# Patient Record
Sex: MALE | Race: ASIAN | NOT HISPANIC OR LATINO | URBAN - METROPOLITAN AREA
[De-identification: names, ages, dates, MRNs, and addresses within clinical notes are randomized per-mention and may not be internally consistent; named-entity substitution may affect disease eponyms.]

---

## 2021-08-16 ENCOUNTER — INPATIENT (INPATIENT)
Facility: HOSPITAL | Age: 51
LOS: 7 days | Discharge: PSYCHIATRIC OR SUBSTANCE USE FACILITY | DRG: 885 | End: 2021-08-24
Attending: GENERAL ACUTE CARE HOSPITAL | Admitting: STUDENT IN AN ORGANIZED HEALTH CARE EDUCATION/TRAINING PROGRAM
Payer: MEDICAID

## 2021-08-16 VITALS
RESPIRATION RATE: 18 BRPM | DIASTOLIC BLOOD PRESSURE: 69 MMHG | WEIGHT: 139.99 LBS | HEART RATE: 79 BPM | TEMPERATURE: 98 F | SYSTOLIC BLOOD PRESSURE: 120 MMHG | OXYGEN SATURATION: 96 % | HEIGHT: 63 IN

## 2021-08-16 DIAGNOSIS — F29 UNSPECIFIED PSYCHOSIS NOT DUE TO A SUBSTANCE OR KNOWN PHYSIOLOGICAL CONDITION: ICD-10-CM

## 2021-08-16 LAB
ALBUMIN SERPL ELPH-MCNC: 4.2 G/DL — SIGNIFICANT CHANGE UP (ref 3.3–5)
ALP SERPL-CCNC: 45 U/L — SIGNIFICANT CHANGE UP (ref 40–120)
ALT FLD-CCNC: 20 U/L — SIGNIFICANT CHANGE UP (ref 10–45)
AMPHET UR-MCNC: NEGATIVE — SIGNIFICANT CHANGE UP
ANION GAP SERPL CALC-SCNC: 13 MMOL/L — SIGNIFICANT CHANGE UP (ref 5–17)
APPEARANCE UR: ABNORMAL
AST SERPL-CCNC: 51 U/L — HIGH (ref 10–40)
BACTERIA # UR AUTO: ABNORMAL
BARBITURATES UR SCN-MCNC: NEGATIVE — SIGNIFICANT CHANGE UP
BASOPHILS # BLD AUTO: 0.02 K/UL — SIGNIFICANT CHANGE UP (ref 0–0.2)
BASOPHILS NFR BLD AUTO: 0.4 % — SIGNIFICANT CHANGE UP (ref 0–2)
BENZODIAZ UR-MCNC: NEGATIVE — SIGNIFICANT CHANGE UP
BILIRUB SERPL-MCNC: 2.2 MG/DL — HIGH (ref 0.2–1.2)
BILIRUB UR-MCNC: ABNORMAL
BUN SERPL-MCNC: 30 MG/DL — HIGH (ref 7–23)
CALCIUM SERPL-MCNC: 9 MG/DL — SIGNIFICANT CHANGE UP (ref 8.4–10.5)
CHLORIDE SERPL-SCNC: 108 MMOL/L — SIGNIFICANT CHANGE UP (ref 96–108)
CO2 SERPL-SCNC: 21 MMOL/L — LOW (ref 22–31)
COCAINE METAB.OTHER UR-MCNC: NEGATIVE — SIGNIFICANT CHANGE UP
COLOR SPEC: ABNORMAL
COMMENT - URINE: SIGNIFICANT CHANGE UP
CREAT SERPL-MCNC: 0.84 MG/DL — SIGNIFICANT CHANGE UP (ref 0.5–1.3)
DIFF PNL FLD: NEGATIVE — SIGNIFICANT CHANGE UP
EOSINOPHIL # BLD AUTO: 0.24 K/UL — SIGNIFICANT CHANGE UP (ref 0–0.5)
EOSINOPHIL NFR BLD AUTO: 5.2 % — SIGNIFICANT CHANGE UP (ref 0–6)
EPI CELLS # UR: 2 — SIGNIFICANT CHANGE UP
ETHANOL SERPL-MCNC: SIGNIFICANT CHANGE UP MG/DL (ref 0–10)
GLUCOSE SERPL-MCNC: 126 MG/DL — HIGH (ref 70–99)
GLUCOSE UR QL: NEGATIVE — SIGNIFICANT CHANGE UP
HCT VFR BLD CALC: 37.5 % — LOW (ref 39–50)
HGB BLD-MCNC: 12.1 G/DL — LOW (ref 13–17)
IMM GRANULOCYTES NFR BLD AUTO: 0.2 % — SIGNIFICANT CHANGE UP (ref 0–1.5)
KETONES UR-MCNC: ABNORMAL
LEUKOCYTE ESTERASE UR-ACNC: NEGATIVE — SIGNIFICANT CHANGE UP
LYMPHOCYTES # BLD AUTO: 1.71 K/UL — SIGNIFICANT CHANGE UP (ref 1–3.3)
LYMPHOCYTES # BLD AUTO: 37 % — SIGNIFICANT CHANGE UP (ref 13–44)
MCHC RBC-ENTMCNC: 30.3 PG — SIGNIFICANT CHANGE UP (ref 27–34)
MCHC RBC-ENTMCNC: 32.3 GM/DL — SIGNIFICANT CHANGE UP (ref 32–36)
MCV RBC AUTO: 93.8 FL — SIGNIFICANT CHANGE UP (ref 80–100)
METHADONE UR-MCNC: NEGATIVE — SIGNIFICANT CHANGE UP
MONOCYTES # BLD AUTO: 0.56 K/UL — SIGNIFICANT CHANGE UP (ref 0–0.9)
MONOCYTES NFR BLD AUTO: 12.1 % — SIGNIFICANT CHANGE UP (ref 2–14)
NEUTROPHILS # BLD AUTO: 2.08 K/UL — SIGNIFICANT CHANGE UP (ref 1.8–7.4)
NEUTROPHILS NFR BLD AUTO: 45.1 % — SIGNIFICANT CHANGE UP (ref 43–77)
NITRITE UR-MCNC: NEGATIVE — SIGNIFICANT CHANGE UP
NRBC # BLD: 0 /100 WBCS — SIGNIFICANT CHANGE UP (ref 0–0)
OPIATES UR-MCNC: NEGATIVE — SIGNIFICANT CHANGE UP
OXYCODONE UR-MCNC: NEGATIVE — SIGNIFICANT CHANGE UP
PCP SPEC-MCNC: SIGNIFICANT CHANGE UP
PCP UR-MCNC: NEGATIVE — SIGNIFICANT CHANGE UP
PH UR: 5.5 — SIGNIFICANT CHANGE UP (ref 5–8)
PLATELET # BLD AUTO: 203 K/UL — SIGNIFICANT CHANGE UP (ref 150–400)
POTASSIUM SERPL-MCNC: 3.5 MMOL/L — SIGNIFICANT CHANGE UP (ref 3.5–5.3)
POTASSIUM SERPL-SCNC: 3.5 MMOL/L — SIGNIFICANT CHANGE UP (ref 3.5–5.3)
PROT SERPL-MCNC: 6.1 G/DL — SIGNIFICANT CHANGE UP (ref 6–8.3)
PROT UR-MCNC: ABNORMAL
RBC # BLD: 4 M/UL — LOW (ref 4.2–5.8)
RBC # FLD: 14.3 % — SIGNIFICANT CHANGE UP (ref 10.3–14.5)
RBC CASTS # UR COMP ASSIST: 1 /HPF — SIGNIFICANT CHANGE UP (ref 0–4)
SARS-COV-2 RNA SPEC QL NAA+PROBE: DETECTED
SARS-COV-2 RNA SPEC QL NAA+PROBE: DETECTED
SODIUM SERPL-SCNC: 142 MMOL/L — SIGNIFICANT CHANGE UP (ref 135–145)
SP GR SPEC: 1.04 — HIGH (ref 1.01–1.02)
THC UR QL: NEGATIVE — SIGNIFICANT CHANGE UP
TSH SERPL-MCNC: 3.75 UIU/ML — SIGNIFICANT CHANGE UP (ref 0.27–4.2)
UROBILINOGEN FLD QL: NEGATIVE — SIGNIFICANT CHANGE UP
WBC # BLD: 4.62 K/UL — SIGNIFICANT CHANGE UP (ref 3.8–10.5)
WBC # FLD AUTO: 4.62 K/UL — SIGNIFICANT CHANGE UP (ref 3.8–10.5)
WBC UR QL: 3 /HPF — SIGNIFICANT CHANGE UP (ref 0–5)

## 2021-08-16 PROCEDURE — 99285 EMERGENCY DEPT VISIT HI MDM: CPT

## 2021-08-16 PROCEDURE — 71045 X-RAY EXAM CHEST 1 VIEW: CPT | Mod: 26

## 2021-08-16 PROCEDURE — 93010 ELECTROCARDIOGRAM REPORT: CPT

## 2021-08-16 PROCEDURE — 70498 CT ANGIOGRAPHY NECK: CPT | Mod: 26,MA

## 2021-08-16 PROCEDURE — 99222 1ST HOSP IP/OBS MODERATE 55: CPT

## 2021-08-16 PROCEDURE — 70496 CT ANGIOGRAPHY HEAD: CPT | Mod: 26,MA

## 2021-08-16 NOTE — ED BEHAVIORAL HEALTH ASSESSMENT NOTE - RISK ASSESSMENT
Risk factors:, Auditory hallucinations, unable to care for self 2/2 current psychiatric illness, no history of SI/HI    Protective factors:  help-seeking behaviors, future-oriented Moderate Acute Suicide Risk

## 2021-08-16 NOTE — ED BEHAVIORAL HEALTH ASSESSMENT NOTE - HPI (INCLUDE ILLNESS QUALITY, SEVERITY, DURATION, TIMING, CONTEXT, MODIFYING FACTORS, ASSOCIATED SIGNS AND SYMPTOMS)
50 y/o Mandarin speaking, domiciled, single employed w/p recent hospitalization of COVID infection (?mid July) who was BIBEMS for tom kevin after landlorfelicia noticed he has been making strange noises at odd hours. Of note patient does not have history of psychiatric diagnoses. Per patient he has been "cleaning at night since August 11" due to which he has not slept well. Notes he hears noises like "Wooh and ahhhh" and is convinced "witchcraft is happening." Patient states his head has been clouded since his COVID infection, hospitalization and subsequent d/c. Patient also mentioned to the ED team that he was convinced he was being watched and a camera had been placed underneath his bed to spy on him. Patient reports he has been cleaning endlessly trying to locate an pungent odor and states there is "too much garbage around," and insinuates someone sabotaged him and left this garbage in his apartment while he was hospitalized. Patient states he does not speak to his roommate and does not feel this has been the work of his roommate. He is aware of the noise complaints made about him with the landlord but states he must clean, or he "falls everywhere,' as he shows multiple small punctures and abrasions on his legs bilaterally. Patient haded provider 4 pages of written notes stating it is a timeline, the content of the note is disorganized and english is interspersed with English. Patient continues to jot down names of physicians and discharge with text such as " I just sitting in front of TV," while adding what appears to be his hospital course. Denies use of substances. Denies any depressive symptoms but admits to heightened anxiety. Denies any SI/HI/AVH currently and has no plan or intent of self-harm.    Collateral Hx not available, patient does not have dick's phone number at this time. 50 y/o Mandarin speaking, domiciled, single employed w/p recent hospitalization of COVID infection (?mid July) who was BIBEMS for tom kevin after dick noticed he has been making strange noises at odd hours. Of note patient does not have history of psychiatric diagnoses. Per patient he has been "cleaning at night since August 11" due to which he has not slept well. Notes he hears noises like "Wooh and ahhhh" and is convinced "witchcraft is happening." Patient states his head has been clouded since his COVID infection, hospitalization and subsequent d/c. Patient also mentioned to the ED team that he was convinced he was being watched and a camera had been placed underneath his bed to spy on him. Patient reports he has been cleaning endlessly trying to locate an pungent odor and states there is "too much garbage around," and insinuates someone sabotaged him and left this garbage in his apartment while he was hospitalized. Patient states he does not speak to his roommate and does not feel this has been the work of his roommate. He is aware of the noise complaints made about him with the landlord but states he must clean, or he "falls everywhere,' as he shows multiple small punctures and abrasions on his legs bilaterally. Patient handed provider 4 pages of written notes stating it is a timeline, the content of the note is disorganized and english is interspersed with English. Patient continues to jot down names of physicians and discharge with text such as " I just sitting in front of TV," while adding what appears to be his hospital course. Denies use of substances. Denies any depressive symptoms but admits to heightened anxiety. Denies any SI/HI/AVH currently and has no plan or intent of self-harm.    Collateral Hx not available, patient does not have dick's phone number at this time. He says he has no family in the [place].  He has friends but has lost his cell phone and does not recall any numbers.

## 2021-08-16 NOTE — ED PROVIDER NOTE - PROGRESS NOTE DETAILS
MAEVE Diaz, Attending: signed out to me pending psych recs. Given no psych hx and recent severe Covid-19 infection, in patient [facility] team feels pt needs neuro/ID consult to evaluate for neurological sequelae of Covid causing psychotic sx. Admitted to Dr. Perez on medicine.

## 2021-08-16 NOTE — ED BEHAVIORAL HEALTH ASSESSMENT NOTE - SUMMARY
1 y/o Mandarin speaking, domiciled, single employed w/p recent hospitalization of COVID infection (?mid July) who was BIBEMS for karissastoney kevin after landlord noticed he has been making strange noises at odd hours. Of note patient does not have history of psychiatric diagnoses. Per patient he has been "cleaning at night since August 11" due to which he has not slept well. Notes he hears noises like "Wooh and ahhhh" and is convinced "witchcraft is happening." Patient states his head has been clouded since his COVID infection, hospitalization and subsequent d/c. Patient also mentioned to the ED team that he was convinced he was being watched and a camera had been placed underneath his bed to spy on him. Patient reports he has been cleaning endlessly trying to locate an pungent odor and states there is "too much garbage around," and insinuates someone sabotaged him and left this garbage in his apartment while he was hospitalized. Patient states he does not speak to his roommate and does not feel this has been the work of his roommate. He is aware of the noise complaints made about him with the landlord but states he must clean, or he "falls everywhere,' as he shows multiple small punctures and abrasions on his legs bilaterally. Patient haded provider 4 pages of written notes stating it is a timeline, the content of the note is disorganized and english is interspersed with English. Patient continues to jot down names of physicians and discharge with text such as " I just sitting in front of TV" in front Denies use of substances. Denies any depressive symptoms but admits to heightened anxiety. Denies any SI/HI/AVH currently and has no plan or intent of self-harm. Pt is a 52 y/o Mandarin speaking, domiciled, single employed w/p recent hospitalization of COVID infection (?mid July) who was BIBEMS for bizarre behavior after landlord noticed he has been making strange noises at odd hours. Of note patient does not have any known history of psychiatric diagnoses. Per patient he has been "cleaning at night since August 11" due to which he has not slept much. Notes he hears noises like "Wooh and ahhhh" and is convinced "witchcraft is happening." Patient states his head has been clouded since his COVID infection, hospitalization and subsequent d/c. Patient also mentioned to the ED team that he was convinced he was being watched and a camera had been placed underneath his bed to spy on him. Patient reports he has been cleaning endlessly trying to locate an pungent odor and states there is "too much garbage around," and insinuates someone sabotaged him and left this garbage in his apartment while he was hospitalized. Patient states he does not speak to his roommate and does not feel this has been the work of his roommate. He is aware of the noise complaints made about him with the landlord but states he must clean, or he "falls everywhere,' as he shows multiple small punctures and abrasions on his legs bilaterally. Patient handed provider 4 pages of written notes stating it is a timeline, the content of the note is disorganized and Mandarin is interspersed with English. Patient continues to jot down names of physicians and discharge with text such as " I just sitting in front of TV" in front Denies use of substances. Denies any depressive symptoms but admits to heightened anxiety. Denies any SI/HI/AVH currently and has no plan or intent of self-harm.  Pt understands that he will be admitted to a psychiatric unit and would benefit from medication to help him.

## 2021-08-16 NOTE — ED PROVIDER NOTE - OBJECTIVE STATEMENT
52 y/o M with hx of recent COVID presents to the ED for psych eval. Patient states he thinks people are watching him and know things about his life, he is unable to state who is watching him specifically. Reports he thinks someone put a camera underneath his bed to spy on him. He also reports hearing voices occasionally, states it sounds like muffled sounds in his ears. Denies visual hallucinations. No SI/HI. Denies previous psychiatric history. States he lives alone and rents a room in a home, his family all lives in [place]. Per EMS, patient has been acting strange since hospitalized with COVID and dick became concerned as he was making a lot of strange noises from his room.

## 2021-08-16 NOTE — ED PROVIDER NOTE - ATTENDING CONTRIBUTION TO CARE
Knee arthrocentesis performed, 60cc of cloudy synovial fluid drained without complication.
50 y/o M with hx of recent COVID presenting to the ED for psych evaluation. Vitals stable. Patient with some delusional thinking on exam, endorses thinking people are spying on him and hearing voices. Will check labs, CTA head to evaluate for microthrombi in setting of COVID, consult psych.

## 2021-08-16 NOTE — ED PROVIDER NOTE - CLINICAL SUMMARY MEDICAL DECISION MAKING FREE TEXT BOX
50 y/o M with hx of recent COVID presenting to the ED for psych evaluation. Vitals stable. Patient with some delusional thinking on exam, endorses thinking people are spying on him and hearing voices. Will check labs, CTA head to evaluate for microthrombi in setting of COVID, consult psych. Sukh Artis, DO PGY3

## 2021-08-16 NOTE — ED ADULT NURSE NOTE - OBJECTIVE STATEMENT
51 Chinese male who works as an  domiciled with a family in [place] where he rents a room,  was BIB EMS from home after the landlord called 911. According to EMS, pt landlord c/o hearing noises "upstairs" in pt room , pt has not been eating or drinking in 7 days and has been heard talking to himself and c/o several cameras being installed in his room, he stated "they know a lot", they has been watching me". [place] and has no immediate family present in the [place]. Pt denied suicidal and homicidal ideations, denies using illicit drugs and only drinks occasionally, about 2-3 beer every time. Pt landlord told EMS that pt has beer acting like this since after he got a COVID infection in July. where pt was hospitalised for 12 days. Pt stated he still uses oxygen at home. Pt owns a BB gun at home and was carryting the BB pellets in his property. 51 Chinese male who works as an  domiciled with a family in [place] where he rents a room,  was BIB EMS from home after the landlord [phone] called 911. According to EMS, pt landlord c/o hearing noises "upstairs" in pt room , pt has not been eating or drinking in 7 days and has been heard talking to himself and c/o several cameras being installed in his room, he stated "they know a lot", they has been watching me". [place] and has no immediate family present in the [place]. Pt denied suicidal and homicidal ideations, denies using illicit drugs and only drinks occasionally, about 2-3 beer every time. Pt landlord told EMS that pt has beer acting like this since after he got a COVID infection in July. where pt was hospitalised for 12 days. Pt stated he still uses oxygen at home. Pt owns a BB gun at home and was carryting the BB pellets in his property. 51 Chinese male who works as an  domiciled with a family in [place] where he rents a room,  was BIB EMS from home after the landlord [phone] called 911. According to EMS, pt landlord c/o hearing noises "upstairs" in pt room , pt has not been eating or drinking in 7 days and has been heard talking to himself and c/o several cameras being installed in his room, he stated "they know a lot", they has been watching me". [place] and has no immediate family present in the [place]. Pt denied suicidal and homicidal ideations, denies using illicit drugs and only drinks occasionally, about 2-3 beer every time. Pt landlord told EMS that pt has beer acting like this since after he got a COVID infection in July. where pt was hospitalised for 12 days. Pt stated he still uses oxygen at home. Pt owns a BB gun at home and was carryting the BB pellets in his belongings in the ER

## 2021-08-16 NOTE — ED PROVIDER NOTE - PHYSICAL EXAMINATION
GENERAL: Awake, alert, NAD  HEENT: NC/AT, moist mucous membranes, PERRL, EOMI  LUNGS: CTAB, no wheezes or crackles   CARDIAC: RRR, no m/r/g  ABDOMEN: Soft, normal BS, non tender, non distended, no rebound, no guarding  BACK: No midline spinal tenderness, no CVA tenderness  EXT: No edema, no calf tenderness, 2+ DP pulses bilaterally, no deformities.  NEURO: A&Ox3. Moving all extremities.  SKIN: Warm and dry. No rash.  PSYCH: Normal affect. GENERAL: Awake, alert, NAD  HEENT: NC/AT, moist mucous membranes, PERRL, EOMI  LUNGS: CTAB, no wheezes or crackles   CARDIAC: RRR, no m/r/g  ABDOMEN: Soft, normal BS, non tender, non distended, no rebound, no guarding  BACK: No midline spinal tenderness, no CVA tenderness  EXT: No edema, no calf tenderness, 2+ DP pulses bilaterally, no deformities.  NEURO: A&Ox3. Moving all extremities.  SKIN: Warm and dry. No rash.  PSYCH: +delusional thinking, no pressured speech

## 2021-08-16 NOTE — ED BEHAVIORAL HEALTH ASSESSMENT NOTE - DESCRIPTION
As noted above; no PRNs required. Recent COVID-19 infection Resides in apartment with roommate who he is not close to. Family is in [place] and does not have a significant other. Is employed as a

## 2021-08-16 NOTE — ED ADULT NURSE REASSESSMENT NOTE - NS ED NURSE REASSESS COMMENT FT1
since pt. was covid +, his transfer is pending @ this time ,SW is waiting from psych supv from [facility] call back . 1:1 CO for psychosis maintained.

## 2021-08-16 NOTE — ED BEHAVIORAL HEALTH ASSESSMENT NOTE - OTHER PAST PSYCHIATRIC HISTORY (INCLUDE DETAILS REGARDING ONSET, COURSE OF ILLNESS, INPATIENT/OUTPATIENT TREATMENT)
Went to see a counselor in 2009 when he was facing employment. No history of parasuicidal gestures/ hospitalizations reported.

## 2021-08-16 NOTE — ED BEHAVIORAL HEALTH ASSESSMENT NOTE - CASE SUMMARY
Pt is a 52 y/o Mandarin speaking, domiciled, single employed w/p recent hospitalization of COVID infection (?mid July) who was BIBEMS for bizarre behavior after landlord noticed he has been making strange noises at odd hours. Of note patient does not have any known history of psychiatric diagnoses. Per patient he has been "cleaning at night since August 11" due to which he has not slept much. Notes he hears noises like "Wooh and ahhhh" and is convinced "witchcraft is happening." Patient states his head has been clouded since his COVID infection, hospitalization and subsequent d/c. Patient also mentioned to the ED team that he was convinced he was being watched and a camera had been placed underneath his bed to spy on him. Patient reports he has been cleaning endlessly trying to locate an pungent odor and states there is "too much garbage around," and insinuates someone sabotaged him and left this garbage in his apartment while he was hospitalized. Patient states he does not speak to his roommate and does not feel this has been the work of his roommate. He is aware of the noise complaints made about him with the landlord but states he must clean, or he "falls everywhere,' as he shows multiple small punctures and abrasions on his legs bilaterally. Pt denies any depressive symptoms but admits to heightened anxiety. Denies any SI/HI/AVH currently and has no plan or intent of self-harm.  Pt understands that he will be admitted to a psychiatric unit and would benefit from medication to help him.

## 2021-08-17 DIAGNOSIS — D64.9 ANEMIA, UNSPECIFIED: ICD-10-CM

## 2021-08-17 DIAGNOSIS — E80.6 OTHER DISORDERS OF BILIRUBIN METABOLISM: ICD-10-CM

## 2021-08-17 DIAGNOSIS — U07.1 COVID-19: ICD-10-CM

## 2021-08-17 LAB
ALBUMIN SERPL ELPH-MCNC: 3.7 G/DL — SIGNIFICANT CHANGE UP (ref 3.3–5)
ALP SERPL-CCNC: 58 U/L — SIGNIFICANT CHANGE UP (ref 40–120)
ALT FLD-CCNC: 17 U/L — SIGNIFICANT CHANGE UP (ref 10–45)
ANION GAP SERPL CALC-SCNC: 14 MMOL/L — SIGNIFICANT CHANGE UP (ref 5–17)
AST SERPL-CCNC: 36 U/L — SIGNIFICANT CHANGE UP (ref 10–40)
BILIRUB DIRECT SERPL-MCNC: 0.2 MG/DL — SIGNIFICANT CHANGE UP (ref 0–0.2)
BILIRUB SERPL-MCNC: 0.9 MG/DL — SIGNIFICANT CHANGE UP (ref 0.2–1.2)
BUN SERPL-MCNC: 27 MG/DL — HIGH (ref 7–23)
CALCIUM SERPL-MCNC: 8.8 MG/DL — SIGNIFICANT CHANGE UP (ref 8.4–10.5)
CHLORIDE SERPL-SCNC: 108 MMOL/L — SIGNIFICANT CHANGE UP (ref 96–108)
CO2 SERPL-SCNC: 20 MMOL/L — LOW (ref 22–31)
COVID-19 SPIKE DOMAIN AB INTERP: POSITIVE
COVID-19 SPIKE DOMAIN ANTIBODY RESULT: >250 U/ML — HIGH
CREAT SERPL-MCNC: 0.74 MG/DL — SIGNIFICANT CHANGE UP (ref 0.5–1.3)
CRP SERPL-MCNC: <3 MG/L — SIGNIFICANT CHANGE UP (ref 0–4)
D DIMER BLD IA.RAPID-MCNC: 433 NG/ML DDU — HIGH
ERYTHROCYTE [SEDIMENTATION RATE] IN BLOOD: < 2 MM/HR — SIGNIFICANT CHANGE UP (ref 0–20)
FERRITIN SERPL-MCNC: 630 NG/ML — HIGH (ref 30–400)
FOLATE SERPL-MCNC: 8.2 NG/ML — SIGNIFICANT CHANGE UP
GLUCOSE SERPL-MCNC: 104 MG/DL — HIGH (ref 70–99)
HCT VFR BLD CALC: 37.2 % — LOW (ref 39–50)
HGB BLD-MCNC: 11.8 G/DL — LOW (ref 13–17)
LDH SERPL L TO P-CCNC: 572 U/L — HIGH (ref 50–242)
MAGNESIUM SERPL-MCNC: 2.1 MG/DL — SIGNIFICANT CHANGE UP (ref 1.6–2.6)
MCHC RBC-ENTMCNC: 29.7 PG — SIGNIFICANT CHANGE UP (ref 27–34)
MCHC RBC-ENTMCNC: 31.7 GM/DL — LOW (ref 32–36)
MCV RBC AUTO: 93.7 FL — SIGNIFICANT CHANGE UP (ref 80–100)
NRBC # BLD: 0 /100 WBCS — SIGNIFICANT CHANGE UP (ref 0–0)
PHOSPHATE SERPL-MCNC: 3 MG/DL — SIGNIFICANT CHANGE UP (ref 2.5–4.5)
PLATELET # BLD AUTO: 190 K/UL — SIGNIFICANT CHANGE UP (ref 150–400)
POTASSIUM SERPL-MCNC: 3.9 MMOL/L — SIGNIFICANT CHANGE UP (ref 3.5–5.3)
POTASSIUM SERPL-SCNC: 3.9 MMOL/L — SIGNIFICANT CHANGE UP (ref 3.5–5.3)
PROT SERPL-MCNC: 5.5 G/DL — LOW (ref 6–8.3)
RBC # BLD: 3.97 M/UL — LOW (ref 4.2–5.8)
RBC # FLD: 14.6 % — HIGH (ref 10.3–14.5)
SARS-COV-2 IGG+IGM SERPL QL IA: >250 U/ML — HIGH
SARS-COV-2 IGG+IGM SERPL QL IA: POSITIVE
SODIUM SERPL-SCNC: 142 MMOL/L — SIGNIFICANT CHANGE UP (ref 135–145)
T PALLIDUM AB TITR SER: NEGATIVE — SIGNIFICANT CHANGE UP
VIT B12 SERPL-MCNC: 330 PG/ML — SIGNIFICANT CHANGE UP (ref 232–1245)
WBC # BLD: 4.44 K/UL — SIGNIFICANT CHANGE UP (ref 3.8–10.5)
WBC # FLD AUTO: 4.44 K/UL — SIGNIFICANT CHANGE UP (ref 3.8–10.5)

## 2021-08-17 PROCEDURE — 99232 SBSQ HOSP IP/OBS MODERATE 35: CPT

## 2021-08-17 PROCEDURE — 99223 1ST HOSP IP/OBS HIGH 75: CPT

## 2021-08-17 PROCEDURE — 99254 IP/OBS CNSLTJ NEW/EST MOD 60: CPT

## 2021-08-17 RX ORDER — OLANZAPINE 15 MG/1
2.5 TABLET, FILM COATED ORAL
Refills: 0 | Status: DISCONTINUED | OUTPATIENT
Start: 2021-08-17 | End: 2021-08-18

## 2021-08-17 RX ORDER — OLANZAPINE 15 MG/1
1.25 TABLET, FILM COATED ORAL ONCE
Refills: 0 | Status: COMPLETED | OUTPATIENT
Start: 2021-08-17 | End: 2021-08-17

## 2021-08-17 RX ORDER — OLANZAPINE 15 MG/1
2.5 TABLET, FILM COATED ORAL EVERY 6 HOURS
Refills: 0 | Status: DISCONTINUED | OUTPATIENT
Start: 2021-08-17 | End: 2021-08-24

## 2021-08-17 RX ORDER — ENOXAPARIN SODIUM 100 MG/ML
40 INJECTION SUBCUTANEOUS DAILY
Refills: 0 | Status: DISCONTINUED | OUTPATIENT
Start: 2021-08-17 | End: 2021-08-24

## 2021-08-17 RX ADMIN — ENOXAPARIN SODIUM 40 MILLIGRAM(S): 100 INJECTION SUBCUTANEOUS at 12:36

## 2021-08-17 RX ADMIN — OLANZAPINE 1.25 MILLIGRAM(S): 15 TABLET, FILM COATED ORAL at 16:29

## 2021-08-17 RX ADMIN — OLANZAPINE 2.5 MILLIGRAM(S): 15 TABLET, FILM COATED ORAL at 18:48

## 2021-08-17 NOTE — BH CONSULTATION LIAISON PROGRESS NOTE - NSBHFUPINTERVALHXFT_PSY_A_CORE
Video Mandarin translation service is utilized (  ID [id number]) to communicate with patient. Patient states he slept well last night but continues to worry about the witchcraft and Rastafarian which he has evidence for on a hard drive at home. Patient rises from bed to show providers a list of stuff that has "caused suffering since August 11) with the following words typed out " Witchcrft, woodoo, Child pronography." Patient laments he does not wish to mix his ongoing COVID infection with the "evil" which he has been fighting. He is convinced his landlord has aided someone in placing cameras in his house and he has been tasked to combat all the evil that he has been journaling about. Patient continues to feel safe while here and denies any overt SI/HI/AVH and has no plan or intent of self-harm.  Video Mandarin translation service is utilized (  ID [id number]) to communicate with patient. Patient states he slept well last night but continues to worry about the witchcraft and Denominational which he has evidence for on a hard drive at home. Patient rises from bed to show providers a list of stuff that has "caused me suffering since August 11" with the following words typed out " Witchcraft, Denominational, Child pornography." Patient laments he does not wish to mix his ongoing COVID infection with the "evil" which he has been fighting. He is convinced his landlord has aided someone in placing cameras in his house and he has been tasked to combat all the evil that he has been journaling about. Patient continues to feel safe while here and denies any overt SI/HI/AVH and has no plan or intent of self-harm.

## 2021-08-17 NOTE — H&P ADULT - PROBLEM SELECTOR PLAN 3
-Possibly related to COVID infection  -Follow-up Direct Bili and LDH (in setting of anemia)  -Trend LFTs

## 2021-08-17 NOTE — BH CONSULTATION LIAISON PROGRESS NOTE - NSBHASSESSMENTFT_PSY_ALL_CORE
52 y/o Mandarin speaking, domiciled, single employed w/p recent hospitalization of COVID infection (?mid July) who was BIBEMS for karissacarlynikita kevni after landlord noticed he has been making strange noises at odd hours. Of note patient does not have history of psychiatric diagnoses. Per patient he has been "cleaning at night since August 11" due to which he has not slept well. Notes he hears noises like "Wooh and ahhhh" and is convinced "witchcraft is happening." Patient states his head has been clouded since his COVID infection, hospitalization and subsequent d/c. Patient also mentioned to the ED team that he was convinced he was being watched and a camera had been placed underneath his bed to spy on him. Patient reports he has been cleaning endlessly trying to locate an pungent odor and states there is "too much garbage around," and insinuates someone sabotaged him and left this garbage in his apartment while he was hospitalized. Patient states he does not speak to his roommate and does not feel this has been the work of his roommate. He is aware of the noise complaints made about him with the landlord but states he must clean, or he "falls everywhere,' as he shows multiple small punctures and abrasions on his legs bilaterally. Patient handed provider 4 pages of written notes stating it is a timeline, the content of the note is disorganized and english is interspersed with English. Patient continues to jot down names of physicians and discharge with text such as " I just sitting in front of TV," while adding what appears to be his hospital course. Denies use of substances. Denies any depressive symptoms but admits to heightened anxiety. Denies any SI/HI/AVH currently and has no plan or intent of self-harm.  --    Patient pleasant and cooperative, continues to be somewhat disorganized and circumferential. Patient has persecutory delusions, is agreeable to initiation of Zyprexa 2.5 mg PO 1800. Would monitor QtC < 500.

## 2021-08-17 NOTE — CONSULT NOTE ADULT - ASSESSMENT
51 year old male with medical history of recent COVID infection (treated at [facility], discharged with home oxygen at beginning of August) and no known psychiatric history who presents to the hospital for agitation + paranoia, states he is being cyberbullied with Hinduism. Neuro exam essentially non-focal CTH, CTA H/N negative    Impression: post-Covid psychosis    Consider MRI brain w/ con if possible  routine EEG  consider psychiatry input  B12, folate TSH reviewed   D-dimer 433  consider adding CRP  51 year old male with medical history of recent COVID infection (treated at [facility], discharged with home oxygen at beginning of August) and no known psychiatric history who presents to the hospital for agitation + paranoia, states he is being cyberbullied with Uatsdin. Neuro exam essentially non-focal CTH, CTA H/N negative    Impression: post-Covid psychosis    Consider MRI brain w/ con if possible  routine EEG  consider psychiatry input  B12, folate TSH reviewed   D-dimer 433  consider adding CRP  clarify medication patient was using at home

## 2021-08-17 NOTE — H&P ADULT - PROBLEM SELECTOR PLAN 1
-Patient with new-onset auditory hallucinations and paranoia after discharge from [facility] for COVID.  -Will request medical records from hospitalization from [facility].  -Appreciate psych recommendations, plan to evaluate for etiology of psychosis (possibly secondary to COVID?)  -CTA head and neck negative for CVA/hemorrhage  -Will order MRI Brain  -Will obtain Neurology consult  -Follow-up B12, Folate -Patient with new-onset auditory hallucinations and paranoia after discharge from [facility] for COVID.  -Will request medical records from hospitalization from [facility].  -Appreciate psych recommendations, plan to evaluate for etiology of psychosis (possibly secondary to COVID?)  -CTA head and neck negative for CVA/hemorrhage  -Will obtain Neurology consult  -Follow-up B12, Folate  -MR brain

## 2021-08-17 NOTE — H&P ADULT - PROBLEM SELECTOR PLAN 2
-Currently comfortable on room air  -Follow-up inflammatory markers in AM- ESR, CRP, ferretin, d-dimer

## 2021-08-17 NOTE — BH CONSULTATION LIAISON PROGRESS NOTE - OTHER
abrasions b/l on LE Heightened paranoia about "evil" which he has been tasked with evidence about to reveal and combat

## 2021-08-17 NOTE — CHART NOTE - NSCHARTNOTEFT_GEN_A_CORE
Patient continues to be delusional, and disorganized and circumferential and unable to focus, but pleasant and cooperative Patient has persecutory delusions.  Will continue constant observation for safety    [id number]
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
## 2021-08-17 NOTE — H&P ADULT - NSHPREVIEWOFSYSTEMS_GEN_ALL_CORE
REVIEW OF SYSTEMS:    CONSTITUTIONAL: No weakness, fevers or chills, no weight loss  EYES/ENT: No visual changes, no vertigo  NECK: No pain or stiffness  RESPIRATORY: No cough, wheezing, hemoptysis; No shortness of breath  CARDIOVASCULAR: No chest pain, palpitations, leg swelling  GASTROINTESTINAL: No abdominal or epigastric pain. No nausea, vomiting, or hematemesis; No diarrhea or constipation. No melena or hematochezia.  GENITOURINARY: No dysuria, frequency or hematuria  NEUROLOGICAL: +rigidity, loss of balance, dysmetria; No numbness or weakness  SKIN: +lower extremity scabs  MSK: No back pain  PSYCH: +auditory hallucinations, no SI

## 2021-08-17 NOTE — H&P ADULT - NSHPSOCIALHISTORY_GEN_ALL_CORE
Denies tobacco abuse  Denies alcohol use  Denies  recreational drug use    Patient states he works as a  at the [employer] near [facility]

## 2021-08-17 NOTE — H&P ADULT - NSHPLABSRESULTS_GEN_ALL_CORE
Personally reviewed old records.  Personally reviewed labs.  Personally reviewed imaging.  Personally reviewed EKG.                          12.1   4.62  )-----------( 203      ( 16 Aug 2021 11:09 )             37.5           142  |  108  |  30<H>  ----------------------------<  126<H>  3.5   |  21<L>  |  0.84    Ca    9.0      16 Aug 2021 11:09    TPro  6.1  /  Alb  4.2  /  TBili  2.2<H>  /  DBili  x   /  AST  51<H>  /  ALT  20  /  AlkPhos  45              LIVER FUNCTIONS - ( 16 Aug 2021 11:09 )  Alb: 4.2 g/dL / Pro: 6.1 g/dL / ALK PHOS: 45 U/L / ALT: 20 U/L / AST: 51 U/L / GGT: x                 Urinalysis Basic - ( 16 Aug 2021 13:14 )    Color: Dark Yellow / Appearance: Turbid / S.038 / pH: x  Gluc: x / Ketone: Moderate  / Bili: Small / Urobili: Negative   Blood: x / Protein: 30 mg/dL / Nitrite: Negative   Leuk Esterase: Negative / RBC: 1 /hpf / WBC 3 /HPF   Sq Epi: x / Non Sq Epi: 2 / Bacteria: Moderate      < from: CT Angio Head w/ IV Cont (21 @ 15:13) >    IMPRESSION: Normal brain CT.Normal CTA of the head and neck. Abnormal lung apices consistent with sequela of Covid infection.    < end of copied text >    < from: Xray Chest 1 View AP/PA (21 @ 11:07) >    IMPRESSION:    The heart is normal in size. Lungs are clear. No pleural effusion. No pneumothorax. No acute bony pathology could be identified.    < end of copied text >

## 2021-08-17 NOTE — BH CONSULTATION LIAISON PROGRESS NOTE - NSBHCONSULTRECOMMENDOTHER_PSY_A_CORE FT
Patient pleasant and cooperative, continues to be somewhat disorganized and circumferential. Patient has persecutory delusions, is agreeable to initiation of Zyprexa 2.5 mg PO 1800. Can dose 1.25 mg PO of Zyprexa now ( one time) to assess tolerability as patient states he is antipsychotic /antidepressent naive but continues to be very anxious and delusional currently.  Please monitor QtC < 500. Psychiatry will continue to follow and will reassess.
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
## 2021-08-17 NOTE — H&P ADULT - NSHPPHYSICALEXAM_GEN_ALL_CORE
PHYSICAL EXAM:   GENERAL: +restless, Alert. No acute distress. Not thin. Not cachectic. Not obese.  HEAD:  Atraumatic. Normocephalic.  EYES: EOMI. PERRLA. Normal conjunctiva/sclera.  ENT: Neck supple. No JVD. Moist oral mucosa. Not edentulous. No thrush.  LYMPH: Normal supraclavicular/cervical lymph nodes.   CARDIAC: Not tachy, Not junior. Regular rhythm. Not irregularly irregular. S1. S2. No murmur. No rub. No distant heart sounds.  LUNG/CHEST: CTAB. BS equal bilaterally. No wheezes. No rales. No rhonchi.  ABDOMEN: Soft. No tenderness. No distension. Normal bowel sounds.  BACK: No midline/vertebral tenderness. No flank tenderness.  VASCULAR: +2 b/l radial or ulnar pulses. Palpable DP pulses.  EXTREMITIES:  No clubbing. No cyanosis. No edema. Moving all 4.  NEUROLOGY: A&Ox3. Normal finger-to-nose test for dysmetria, normal shin-to-edward test  PSYCH: +restless. Occasionally perseverates when answering questions.  SKIN: No jaundice. No erythema. +well healed scabs on bilateral lower extremities  Vascular Access: PIV     ICU Vital Signs Last 24 Hrs  T(C): 37.1 (17 Aug 2021 04:57), Max: 37.1 (17 Aug 2021 04:57)  T(F): 98.7 (17 Aug 2021 04:57), Max: 98.7 (17 Aug 2021 04:57)  HR: 72 (17 Aug 2021 04:57) (72 - 100)  BP: 110/73 (17 Aug 2021 04:57) (104/73 - 129/78)  BP(mean): 95 (16 Aug 2021 23:00) (95 - 95)  ABP: --  ABP(mean): --  RR: 18 (17 Aug 2021 04:57) (16 - 18)  SpO2: 97% (17 Aug 2021 04:57) (95% - 97%)      I&O's Summary
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
## 2021-08-18 LAB
ANION GAP SERPL CALC-SCNC: 10 MMOL/L — SIGNIFICANT CHANGE UP (ref 5–17)
BUN SERPL-MCNC: 16 MG/DL — SIGNIFICANT CHANGE UP (ref 7–23)
CALCIUM SERPL-MCNC: 8.8 MG/DL — SIGNIFICANT CHANGE UP (ref 8.4–10.5)
CHLORIDE SERPL-SCNC: 107 MMOL/L — SIGNIFICANT CHANGE UP (ref 96–108)
CO2 SERPL-SCNC: 23 MMOL/L — SIGNIFICANT CHANGE UP (ref 22–31)
CREAT SERPL-MCNC: 0.7 MG/DL — SIGNIFICANT CHANGE UP (ref 0.5–1.3)
CRP SERPL-MCNC: <3 MG/L — SIGNIFICANT CHANGE UP (ref 0–4)
GLUCOSE SERPL-MCNC: 96 MG/DL — SIGNIFICANT CHANGE UP (ref 70–99)
HCT VFR BLD CALC: 36.1 % — LOW (ref 39–50)
HGB BLD-MCNC: 11.4 G/DL — LOW (ref 13–17)
MCHC RBC-ENTMCNC: 29.4 PG — SIGNIFICANT CHANGE UP (ref 27–34)
MCHC RBC-ENTMCNC: 31.6 GM/DL — LOW (ref 32–36)
MCV RBC AUTO: 93 FL — SIGNIFICANT CHANGE UP (ref 80–100)
NRBC # BLD: 0 /100 WBCS — SIGNIFICANT CHANGE UP (ref 0–0)
PLATELET # BLD AUTO: 168 K/UL — SIGNIFICANT CHANGE UP (ref 150–400)
POTASSIUM SERPL-MCNC: 3.5 MMOL/L — SIGNIFICANT CHANGE UP (ref 3.5–5.3)
POTASSIUM SERPL-SCNC: 3.5 MMOL/L — SIGNIFICANT CHANGE UP (ref 3.5–5.3)
RBC # BLD: 3.88 M/UL — LOW (ref 4.2–5.8)
RBC # FLD: 14.6 % — HIGH (ref 10.3–14.5)
SODIUM SERPL-SCNC: 140 MMOL/L — SIGNIFICANT CHANGE UP (ref 135–145)
WBC # BLD: 3.17 K/UL — LOW (ref 3.8–10.5)
WBC # FLD AUTO: 3.17 K/UL — LOW (ref 3.8–10.5)

## 2021-08-18 PROCEDURE — 99232 SBSQ HOSP IP/OBS MODERATE 35: CPT | Mod: GC

## 2021-08-18 PROCEDURE — 93010 ELECTROCARDIOGRAM REPORT: CPT

## 2021-08-18 PROCEDURE — 95816 EEG AWAKE AND DROWSY: CPT | Mod: 26

## 2021-08-18 RX ORDER — POTASSIUM CHLORIDE 20 MEQ
40 PACKET (EA) ORAL ONCE
Refills: 0 | Status: COMPLETED | OUTPATIENT
Start: 2021-08-18 | End: 2021-08-18

## 2021-08-18 RX ORDER — OLANZAPINE 15 MG/1
5 TABLET, FILM COATED ORAL
Refills: 0 | Status: DISCONTINUED | OUTPATIENT
Start: 2021-08-18 | End: 2021-08-24

## 2021-08-18 RX ADMIN — Medication 40 MILLIEQUIVALENT(S): at 17:34

## 2021-08-18 RX ADMIN — ENOXAPARIN SODIUM 40 MILLIGRAM(S): 100 INJECTION SUBCUTANEOUS at 11:17

## 2021-08-18 RX ADMIN — OLANZAPINE 5 MILLIGRAM(S): 15 TABLET, FILM COATED ORAL at 17:34

## 2021-08-18 NOTE — BH CONSULTATION LIAISON PROGRESS NOTE - NSBHCONSULTRECOMMENDOTHER_PSY_A_CORE FT
Patient pleasant and cooperative, continues to be somewhat disorganized and circumferential. Patient has persecutory delusions, is agreeable to initiation of Zyprexa 2.5 mg PO 1800. Can dose 1.25 mg PO of Zyprexa now ( one time) to assess tolerability as patient states he is antipsychotic /antidepressent naive but continues to be very anxious and delusional currently.  Please monitor QtC < 500. Psychiatry will continue to follow and will reassess.  Increase Zyprexa to 5mg PO 1800   Please monitor QtC < 500. He may benefit from additional imaging such as MRI/EEG as his first episode psychosis etiology may be organic in nature. Psychiatry will continue to follow and will reassess.  Increase Zyprexa to 5mg PO q1800   Please monitor QtC < 500. He may benefit from additional imaging such as MRI/EEG as his first episode psychosis etiology may be organic in nature. Psychiatry will continue to follow and will reassess.

## 2021-08-18 NOTE — BH CONSULTATION LIAISON PROGRESS NOTE - NSICDXBHSECONDARYDX_PSY_ALL_CORE
Psychosis, unspecified psychosis type   F29  2019 novel coronavirus disease (COVID-19)   U07.1  Hyperbilirubinemia   E80.6  Anemia   D64.9  
Psychosis, unspecified psychosis type   F29  2019 novel coronavirus disease (COVID-19)   U07.1  Hyperbilirubinemia   E80.6  Anemia   D64.9

## 2021-08-18 NOTE — BH CONSULTATION LIAISON PROGRESS NOTE - NSBHASSESSMENTFT_PSY_ALL_CORE
52 y/o Mandarin speaking, domiciled, single employed w/p recent hospitalization of COVID infection (?mid July) who was BIBEMS for karissacarlynikita kevin after landlord noticed he has been making strange noises at odd hours. Of note patient does not have history of psychiatric diagnoses. Per patient he has been "cleaning at night since August 11" due to which he has not slept well. Notes he hears noises like "Wooh and ahhhh" and is convinced "witchcraft is happening." Patient states his head has been clouded since his COVID infection, hospitalization and subsequent d/c. Patient also mentioned to the ED team that he was convinced he was being watched and a camera had been placed underneath his bed to spy on him. Patient reports he has been cleaning endlessly trying to locate an pungent odor and states there is "too much garbage around," and insinuates someone sabotaged him and left this garbage in his apartment while he was hospitalized. Patient states he does not speak to his roommate and does not feel this has been the work of his roommate. He is aware of the noise complaints made about him with the landlord but states he must clean, or he "falls everywhere,' as he shows multiple small punctures and abrasions on his legs bilaterally. Patient handed provider 4 pages of written notes stating it is a timeline, the content of the note is disorganized and english is interspersed with English. Patient continues to jot down names of physicians and discharge with text such as " I just sitting in front of TV," while adding what appears to be his hospital course. Denies use of substances. Denies any depressive symptoms but admits to heightened anxiety. Denies any SI/HI/AVH currently and has no plan or intent of self-harm.  --    Patient pleasant and cooperative, continues to be somewhat disorganized and circumferential. Patient has persecutory delusions, is agreeable to initiation of Zyprexa 2.5 mg PO 1800. Would monitor QtC < 500.   50 y/o Mandarin speaking, domiciled, single employed w/p recent hospitalization of COVID infection (?mid July) who was BIBEMS for karissacarlynikita kevin after landlord noticed he has been making strange noises at odd hours. Of note patient does not have history of psychiatric diagnoses. Per patient he has been "cleaning at night since August 11" due to which he has not slept well. Notes he hears noises like "Wooh and ahhhh" and is convinced "witchcraft is happening." Patient states his head has been clouded since his COVID infection, hospitalization and subsequent d/c. Patient also mentioned to the ED team that he was convinced he was being watched and a camera had been placed underneath his bed to spy on him. Patient reports he has been cleaning endlessly trying to locate an pungent odor and states there is "too much garbage around," and insinuates someone sabotaged him and left this garbage in his apartment while he was hospitalized. Patient states he does not speak to his roommate and does not feel this has been the work of his roommate. He is aware of the noise complaints made about him with the landlord but states he must clean, or he "falls everywhere,' as he shows multiple small punctures and abrasions on his legs bilaterally. Patient handed provider 4 pages of written notes stating it is a timeline, the content of the note is disorganized and english is interspersed with English. Patient continues to jot down names of physicians and discharge with text such as " I just sitting in front of TV," while adding what appears to be his hospital course. Denies use of substances. Denies any depressive symptoms but admits to heightened anxiety. Denies any SI/HI/AVH currently and has no plan or intent of self-harm.  --    Patient pleasant and cooperative with presence of persecutory delusions; he tolerated Zyprexa 2.5 mg PO 1800 well with no acute events/PRN use reported overnight.

## 2021-08-18 NOTE — BH CONSULTATION LIAISON PROGRESS NOTE - NSBHFUPINTERVALHXFT_PSY_A_CORE
Telephone Mandarin translation service is utilized (  ID ) to communicate with patient. Patient continues to feel safe while here and [denies any overt SI/HI/AVH and has no plan or intent of self-harm].  Telephone Mandarin translation service is utilized (  ID [id number] ) to communicate with patient. Patient states he slept well last night and tolerated this initiation of Zyprexa well. Patient had added other words to the list of "evils" and notes he is concerned the evidence of "pedophilia on [his] phone" did not reach police officers in time since he has been here. Patient is oriented to location, month and day today but continues to report a clouded thoughts/ability to think since being diagnosed with COVID. Patient continues to feel safe while here and denies any overt SI/HI/AVH and has no plan or intent of self-harm.

## 2021-08-18 NOTE — EEG REPORT - NS EEG TEXT BOX
[facility]  Comprehensive Epile[facility]  Report of Routine EEG with Video    [facility]: [address], [place] [address], Phone [phone]  [facility]: [address], [place] [address], Phone [phone]  [place] Office: [address], [address], [place] [address] Phone [phone]    [facility]: [address] [place] [address], Phone [phone]  [place] Office: [address] [place] [address], Phone [phone]    Patient Name: Min Wick    Age: 51 year  : 1970  Patient ID: -, MRN #: MR# [id number], Location: 4  W4  W  Referring Physician: -  EEG #: [id number]    Study Date: 2021     Technical Information:					  On Instrument: -  Placement and Labeling of Electrodes:  The EEG was performed utilizing 20 channels referential EEG connections (coronal over temporal over parasagittal montage) using all standard 10-20 electrode placements with EKG.  Recording was at a sampling rate of 256 samples per second per channel.  Time synchronized digital video recording was done simultaneously with EEG recording.  A low light infrared camera was used for low light recording.  Jose and seizure detection algorithms were utilized.    History:   Routine Study performed bedside  COR: Awake, Alert, Asleep  No HV due to COVID Precautions  Photic performed  52 y/o Male PMH Anemia, Hyperbilirubinemia, COVID-19, Psychosis  p/w Acute delusion and Hallucination    Pertinent Medication  -  Lovenox    Study Interpretation:  Findings: The background was continuous and reactive. During wakefulness, the posterior dominant rhythm consisted of symmetric, well-modulated 9 Hz activity, with amplitude to 30 uV, that attenuated to eye opening.     Background Slowing:  No generalized background slowing was present.    Focal Slowing:   None were present.    Sleep Background:  Drowsiness was characterized by fragmentation, attenuation, and slowing of the background activity.    Stage II sleep transients were not recorded.    Other Non-Epileptiform Findings:  None were present.    Interictal Epileptiform Activity:   None were present.    Events:  Clinical events: None recorded.  Seizures: None recorded.    Activation Procedures:   Hyperventilation was not performed.    Photic stimulation was performed and did not elicit any abnormalities.      Artifacts:  Intermittent myogenic and movement artifacts were noted.    EEG Summary / Classification:  Normal EEG     EEG Impression / Clinical Correlate:  Normal EEG study.  No epileptiform pattern or seizure seen.    Cezar Cote MD  Attending Physician, [facility] [place]

## 2021-08-18 NOTE — BH CONSULTATION LIAISON PROGRESS NOTE - NSBHCHARTREVIEWINVESTIGATE_PSY_A_CORE FT
EKG from yesterday reviewed, QtC unremarkable.  IMPRESSION: Normal brain CT. Normal CTA of the head and neck. Abnormal lung apices consistent with sequela of Covid infection.  EXAM:  CT ANGIO NECK (W)AW IC                        PROCEDURE DATE:  08/16/2021

## 2021-08-18 NOTE — BH CONSULTATION LIAISON PROGRESS NOTE - OTHER
abrasions b/l on LE Heightened paranoia about "evil" which he has been tasked with evidence about to reveal and combat  abrasions b/l on LE, per assessment yesterday

## 2021-08-19 LAB
ALBUMIN SERPL ELPH-MCNC: 3.3 G/DL — SIGNIFICANT CHANGE UP (ref 3.3–5)
ALP SERPL-CCNC: 43 U/L — SIGNIFICANT CHANGE UP (ref 40–120)
ALT FLD-CCNC: 12 U/L — SIGNIFICANT CHANGE UP (ref 10–45)
ANION GAP SERPL CALC-SCNC: 7 MMOL/L — SIGNIFICANT CHANGE UP (ref 5–17)
AST SERPL-CCNC: 18 U/L — SIGNIFICANT CHANGE UP (ref 10–40)
BILIRUB SERPL-MCNC: 0.6 MG/DL — SIGNIFICANT CHANGE UP (ref 0.2–1.2)
BUN SERPL-MCNC: 14 MG/DL — SIGNIFICANT CHANGE UP (ref 7–23)
CALCIUM SERPL-MCNC: 9.1 MG/DL — SIGNIFICANT CHANGE UP (ref 8.4–10.5)
CHLORIDE SERPL-SCNC: 107 MMOL/L — SIGNIFICANT CHANGE UP (ref 96–108)
CO2 SERPL-SCNC: 25 MMOL/L — SIGNIFICANT CHANGE UP (ref 22–31)
CREAT SERPL-MCNC: 0.79 MG/DL — SIGNIFICANT CHANGE UP (ref 0.5–1.3)
CRP SERPL-MCNC: <3 MG/L — SIGNIFICANT CHANGE UP (ref 0–4)
DRUG SCREEN, SERUM: SIGNIFICANT CHANGE UP
GLUCOSE SERPL-MCNC: 104 MG/DL — HIGH (ref 70–99)
HCT VFR BLD CALC: 36.2 % — LOW (ref 39–50)
HGB BLD-MCNC: 11.5 G/DL — LOW (ref 13–17)
MAGNESIUM SERPL-MCNC: 1.9 MG/DL — SIGNIFICANT CHANGE UP (ref 1.6–2.6)
MCHC RBC-ENTMCNC: 29.9 PG — SIGNIFICANT CHANGE UP (ref 27–34)
MCHC RBC-ENTMCNC: 31.8 GM/DL — LOW (ref 32–36)
MCV RBC AUTO: 94.3 FL — SIGNIFICANT CHANGE UP (ref 80–100)
NRBC # BLD: 0 /100 WBCS — SIGNIFICANT CHANGE UP (ref 0–0)
PLATELET # BLD AUTO: 165 K/UL — SIGNIFICANT CHANGE UP (ref 150–400)
POTASSIUM SERPL-MCNC: 3.8 MMOL/L — SIGNIFICANT CHANGE UP (ref 3.5–5.3)
POTASSIUM SERPL-SCNC: 3.8 MMOL/L — SIGNIFICANT CHANGE UP (ref 3.5–5.3)
PROT SERPL-MCNC: 4.8 G/DL — LOW (ref 6–8.3)
RBC # BLD: 3.84 M/UL — LOW (ref 4.2–5.8)
RBC # FLD: 14.6 % — HIGH (ref 10.3–14.5)
SODIUM SERPL-SCNC: 139 MMOL/L — SIGNIFICANT CHANGE UP (ref 135–145)
WBC # BLD: 2.92 K/UL — LOW (ref 3.8–10.5)
WBC # FLD AUTO: 2.92 K/UL — LOW (ref 3.8–10.5)

## 2021-08-19 PROCEDURE — 99232 SBSQ HOSP IP/OBS MODERATE 35: CPT

## 2021-08-19 PROCEDURE — 70553 MRI BRAIN STEM W/O & W/DYE: CPT | Mod: 26

## 2021-08-19 RX ORDER — MAGNESIUM SULFATE 500 MG/ML
1 VIAL (ML) INJECTION ONCE
Refills: 0 | Status: COMPLETED | OUTPATIENT
Start: 2021-08-19 | End: 2021-08-19

## 2021-08-19 RX ORDER — POTASSIUM CHLORIDE 20 MEQ
20 PACKET (EA) ORAL ONCE
Refills: 0 | Status: COMPLETED | OUTPATIENT
Start: 2021-08-19 | End: 2021-08-19

## 2021-08-19 RX ADMIN — Medication 100 GRAM(S): at 17:23

## 2021-08-19 RX ADMIN — OLANZAPINE 2.5 MILLIGRAM(S): 15 TABLET, FILM COATED ORAL at 17:24

## 2021-08-19 RX ADMIN — Medication 20 MILLIEQUIVALENT(S): at 17:24

## 2021-08-19 RX ADMIN — OLANZAPINE 5 MILLIGRAM(S): 15 TABLET, FILM COATED ORAL at 17:26

## 2021-08-19 RX ADMIN — ENOXAPARIN SODIUM 40 MILLIGRAM(S): 100 INJECTION SUBCUTANEOUS at 11:24

## 2021-08-19 NOTE — BH CONSULTATION LIAISON PROGRESS NOTE - NSBHASSESSMENTFT_PSY_ALL_CORE
52 y/o Mandarin speaking, domiciled, single employed w/p recent hospitalization of COVID infection (?mid July) who was BIBEMS for karissacarlynikita kevin after landlord noticed he has been making strange noises at odd hours. Of note patient does not have history of psychiatric diagnoses. Per patient he has been "cleaning at night since August 11" due to which he has not slept well. Notes he hears noises like "Wooh and ahhhh" and is convinced "witchcraft is happening." Patient states his head has been clouded since his COVID infection, hospitalization and subsequent d/c. Patient also mentioned to the ED team that he was convinced he was being watched and a camera had been placed underneath his bed to spy on him. Patient reports he has been cleaning endlessly trying to locate an pungent odor and states there is "too much garbage around," and insinuates someone sabotaged him and left this garbage in his apartment while he was hospitalized. Patient states he does not speak to his roommate and does not feel this has been the work of his roommate. He is aware of the noise complaints made about him with the landlord but states he must clean, or he "falls everywhere,' as he shows multiple small punctures and abrasions on his legs bilaterally. Patient handed provider 4 pages of written notes stating it is a timeline, the content of the note is disorganized and english is interspersed with English. Patient continues to jot down names of physicians and discharge with text such as " I just sitting in front of TV," while adding what appears to be his hospital course. Denies use of substances. Denies any depressive symptoms but admits to heightened anxiety. Denies any SI/HI/AVH currently and has no plan or intent of self-harm.  --    Patient pleasant and cooperative with presence of persecutory delusions; he tolerated Zyprexa 5 mg PO 1800 well with no acute events/PRN use reported overnight.

## 2021-08-19 NOTE — BH CONSULTATION LIAISON PROGRESS NOTE - NSBHCHARTREVIEWINVESTIGATE_PSY_A_CORE FT
IMPRESSION: Normal brain CT. Normal CTA of the head and neck. Abnormal lung apices consistent with sequela of Covid infection.  EXAM:  CT ANGIO NECK (W)AW IC                        PROCEDURE DATE:  08/16/2021

## 2021-08-19 NOTE — BH CONSULTATION LIAISON PROGRESS NOTE - NSBHCONSULTRECOMMENDOTHER_PSY_A_CORE FT
Increase Zyprexa to 5mg PO q1800   Please monitor QtC < 500. He may benefit from additional imaging such as MRI/EEG as his first episode psychosis etiology may be organic in nature. Psychiatry will continue to follow and will reassess.

## 2021-08-19 NOTE — BH CONSULTATION LIAISON PROGRESS NOTE - OTHER
Heightened paranoia about "evil" which he has been tasked with evidence about to reveal and combat  abrasions b/l on LE, per assessment yesterday

## 2021-08-19 NOTE — BH CONSULTATION LIAISON PROGRESS NOTE - NSBHFUPINTERVALHXFT_PSY_A_CORE
Patient states he slept well last night and awoke at 3 am but then was able to go back to sleep and awoke at 11am.  He prefers to speak English today.  Patient denies that he has any thoughts about his apartment except for worries that his room door may have been left open.  Patient is oriented to location, month and day today but continues to report a clouded thoughts/ability to think since being diagnosed with COVID. Patient continues to feel safe while here and denies any overt SI/HI/AVH and has no plan or intent of self-harm.

## 2021-08-20 LAB
ALBUMIN SERPL ELPH-MCNC: 4.1 G/DL — SIGNIFICANT CHANGE UP (ref 3.3–5)
ALP SERPL-CCNC: 42 U/L — SIGNIFICANT CHANGE UP (ref 40–120)
ALT FLD-CCNC: 16 U/L — SIGNIFICANT CHANGE UP (ref 10–45)
ANION GAP SERPL CALC-SCNC: 9 MMOL/L — SIGNIFICANT CHANGE UP (ref 5–17)
AST SERPL-CCNC: 22 U/L — SIGNIFICANT CHANGE UP (ref 10–40)
BASOPHILS # BLD AUTO: 0.01 K/UL — SIGNIFICANT CHANGE UP (ref 0–0.2)
BASOPHILS NFR BLD AUTO: 0.3 % — SIGNIFICANT CHANGE UP (ref 0–2)
BILIRUB SERPL-MCNC: 0.8 MG/DL — SIGNIFICANT CHANGE UP (ref 0.2–1.2)
BUN SERPL-MCNC: 13 MG/DL — SIGNIFICANT CHANGE UP (ref 7–23)
CALCIUM SERPL-MCNC: 9.4 MG/DL — SIGNIFICANT CHANGE UP (ref 8.4–10.5)
CHLORIDE SERPL-SCNC: 105 MMOL/L — SIGNIFICANT CHANGE UP (ref 96–108)
CO2 SERPL-SCNC: 27 MMOL/L — SIGNIFICANT CHANGE UP (ref 22–31)
CREAT SERPL-MCNC: 0.78 MG/DL — SIGNIFICANT CHANGE UP (ref 0.5–1.3)
EOSINOPHIL # BLD AUTO: 0.13 K/UL — SIGNIFICANT CHANGE UP (ref 0–0.5)
EOSINOPHIL NFR BLD AUTO: 3.4 % — SIGNIFICANT CHANGE UP (ref 0–6)
GLUCOSE SERPL-MCNC: 101 MG/DL — HIGH (ref 70–99)
HCT VFR BLD CALC: 41.8 % — SIGNIFICANT CHANGE UP (ref 39–50)
HGB BLD-MCNC: 13.3 G/DL — SIGNIFICANT CHANGE UP (ref 13–17)
IMM GRANULOCYTES NFR BLD AUTO: 0.8 % — SIGNIFICANT CHANGE UP (ref 0–1.5)
LYMPHOCYTES # BLD AUTO: 1.94 K/UL — SIGNIFICANT CHANGE UP (ref 1–3.3)
LYMPHOCYTES # BLD AUTO: 51.2 % — HIGH (ref 13–44)
MAGNESIUM SERPL-MCNC: 2.3 MG/DL — SIGNIFICANT CHANGE UP (ref 1.6–2.6)
MCHC RBC-ENTMCNC: 30.5 PG — SIGNIFICANT CHANGE UP (ref 27–34)
MCHC RBC-ENTMCNC: 31.8 GM/DL — LOW (ref 32–36)
MCV RBC AUTO: 95.9 FL — SIGNIFICANT CHANGE UP (ref 80–100)
MONOCYTES # BLD AUTO: 0.41 K/UL — SIGNIFICANT CHANGE UP (ref 0–0.9)
MONOCYTES NFR BLD AUTO: 10.8 % — SIGNIFICANT CHANGE UP (ref 2–14)
NEUTROPHILS # BLD AUTO: 1.27 K/UL — LOW (ref 1.8–7.4)
NEUTROPHILS NFR BLD AUTO: 33.5 % — LOW (ref 43–77)
NRBC # BLD: 0 /100 WBCS — SIGNIFICANT CHANGE UP (ref 0–0)
PLATELET # BLD AUTO: 202 K/UL — SIGNIFICANT CHANGE UP (ref 150–400)
POTASSIUM SERPL-MCNC: 4.3 MMOL/L — SIGNIFICANT CHANGE UP (ref 3.5–5.3)
POTASSIUM SERPL-SCNC: 4.3 MMOL/L — SIGNIFICANT CHANGE UP (ref 3.5–5.3)
PROT SERPL-MCNC: 5.9 G/DL — LOW (ref 6–8.3)
RBC # BLD: 4.36 M/UL — SIGNIFICANT CHANGE UP (ref 4.2–5.8)
RBC # FLD: 14.8 % — HIGH (ref 10.3–14.5)
SARS-COV-2 RNA SPEC QL NAA+PROBE: SIGNIFICANT CHANGE UP
SODIUM SERPL-SCNC: 141 MMOL/L — SIGNIFICANT CHANGE UP (ref 135–145)
WBC # BLD: 3.79 K/UL — LOW (ref 3.8–10.5)
WBC # FLD AUTO: 3.79 K/UL — LOW (ref 3.8–10.5)

## 2021-08-20 RX ADMIN — ENOXAPARIN SODIUM 40 MILLIGRAM(S): 100 INJECTION SUBCUTANEOUS at 12:55

## 2021-08-20 RX ADMIN — OLANZAPINE 5 MILLIGRAM(S): 15 TABLET, FILM COATED ORAL at 18:44

## 2021-08-20 RX ADMIN — OLANZAPINE 2.5 MILLIGRAM(S): 15 TABLET, FILM COATED ORAL at 17:42

## 2021-08-21 PROCEDURE — 99232 SBSQ HOSP IP/OBS MODERATE 35: CPT

## 2021-08-21 RX ADMIN — ENOXAPARIN SODIUM 40 MILLIGRAM(S): 100 INJECTION SUBCUTANEOUS at 11:02

## 2021-08-21 RX ADMIN — OLANZAPINE 5 MILLIGRAM(S): 15 TABLET, FILM COATED ORAL at 17:19

## 2021-08-21 NOTE — BH CONSULTATION LIAISON PROGRESS NOTE - NSBHASSESSMENTFT_PSY_ALL_CORE
50 y/o Mandarin speaking, domiciled, single employed w/p recent hospitalization of COVID infection (?mid July) who was BIBEMS for karissacarlynikita kevin after landlord noticed he has been making strange noises at odd hours. Of note patient does not have history of psychiatric diagnoses. Per patient he has been "cleaning at night since August 11" due to which he has not slept well. Notes he hears noises like "Wooh and ahhhh" and is convinced "witchcraft is happening." Patient states his head has been clouded since his COVID infection, hospitalization and subsequent d/c. Patient also mentioned to the ED team that he was convinced he was being watched and a camera had been placed underneath his bed to spy on him. Patient reports he has been cleaning endlessly trying to locate an pungent odor and states there is "too much garbage around," and insinuates someone sabotaged him and left this garbage in his apartment while he was hospitalized. Patient states he does not speak to his roommate and does not feel this has been the work of his roommate. He is aware of the noise complaints made about him with the landlord but states he must clean, or he "falls everywhere,' as he shows multiple small punctures and abrasions on his legs bilaterally. Patient handed provider 4 pages of written notes stating it is a timeline, the content of the note is disorganized and english is interspersed with English. Patient continues to jot down names of physicians and discharge with text such as " I just sitting in front of TV," while adding what appears to be his hospital course. Denies use of substances. Denies any depressive symptoms but admits to heightened anxiety. Denies any SI/HI/AVH currently and has no plan or intent of self-harm.  --    Patient pleasant and cooperative with presence of persecutory delusions; he tolerated Zyprexa 5 mg PO 1800 well with no acute events/PRN use reported overnight.

## 2021-08-21 NOTE — BH CONSULTATION LIAISON PROGRESS NOTE - NSBHFUPINTERVALHXFT_PSY_A_CORE
Patient states he slept well last night and awoke at 3 am but then was able to go back to sleep and awoke at 11am.  He prefers to speak English today.  Patient continued to have concerns about Yaz[employer] causing the trash to accumulate in his apartment.   Patient is oriented to location, month and day today but continues to report a clouded thoughts/ability to think since being diagnosed with COVID. Patient continues to feel safe while here and denies any overt SI/HI/AVH and has no plan or intent of self-harm.

## 2021-08-22 PROCEDURE — 99232 SBSQ HOSP IP/OBS MODERATE 35: CPT

## 2021-08-22 RX ADMIN — OLANZAPINE 5 MILLIGRAM(S): 15 TABLET, FILM COATED ORAL at 17:32

## 2021-08-22 RX ADMIN — ENOXAPARIN SODIUM 40 MILLIGRAM(S): 100 INJECTION SUBCUTANEOUS at 12:23

## 2021-08-22 NOTE — PROGRESS NOTE ADULT - NSPROGADDITIONALINFOA_GEN_ALL_CORE
d/w pt and NP Bhavya.    - Dr. EMILY Wiggins ([facility])  - [phone]
d/w pt and NP.    - Dr. EMILY Wiggins ([facility])  - [phone]

## 2021-08-22 NOTE — BH CONSULTATION LIAISON PROGRESS NOTE - NSBHCONSULTRECOMMENDOTHER_PSY_A_CORE FT
Continue Zyprexa to 5mg PO q1800   Please monitor QtC < 500. He may benefit from additional imaging such as MRI/EEG as his first episode psychosis etiology may be organic in nature. Psychiatry will continue to follow and will reassess.

## 2021-08-22 NOTE — BH CONSULTATION LIAISON PROGRESS NOTE - NSCDBILL_PSY_A_CORE
03728 - Inpatient, moderate complexity
77351 - Inpatient, moderate complexity
[id number] - Inpatient, moderate complexity
58054 - Inpatient, moderate complexity

## 2021-08-22 NOTE — BH CONSULTATION LIAISON PROGRESS NOTE - NSBHFUPINTERVALHXFT_PSY_A_CORE
Patient states he is sleeping well and now just wants to go home.  He understands that he is being cleared by medicine but we are still recommending inpatient psychiatric hospitalization because he had severe delusional thoughts when he was admitted.  He is much less preoccupied with the paranoid thoughts now but was still checking on-line to be sure that mainstream media was not recording his activities at his home.  He says that he checked on line and did not find any evidence that he had been targeted.  He says he would stay at his home and not move out as he had been planning prior to his hospitalization here, since he feels that his home may be safe at this time. Patient continues to feel safe while here and denies any overt SI/HI/AVH and has no plan or intent of self-harm.

## 2021-08-22 NOTE — BH CONSULTATION LIAISON PROGRESS NOTE - NSBHFUPINTERVALCCFT_PSY_A_CORE
"I want to go home.  I thought I was being recorded by mainstream media, but I looked on-line and didn't find any evidence that they were at my home."

## 2021-08-22 NOTE — BH CONSULTATION LIAISON PROGRESS NOTE - NSBHCHARTREVIEWLAB_PSY_A_CORE FT
11.4   3.17  )-----------( 168      ( 18 Aug 2021 06:31 )             36.1     08-18    140  |  107  |  16  ----------------------------<  96  3.5   |  23  |  0.70    Ca    8.8      18 Aug 2021 06:31  Phos  3.0     08-17  Mg     2.1     08-17    TPro  5.5<L>  /  Alb  3.7  /  TBili  0.9  /  DBili  0.2  /  AST  36  /  ALT  17  /  AlkPhos  58  08-17    
                      11.8   4.44  )-----------( 190      ( 17 Aug 2021 08:10 )             37.2     08-17    142  |  108  |  27<H>  ----------------------------<  104<H>  3.9   |  20<L>  |  0.74    Ca    8.8      17 Aug 2021 08:10  Phos  3.0     08-17  Mg     2.1     08-17    TPro  5.5<L>  /  Alb  3.7  /  TBili  0.9  /  DBili  0.2  /  AST  36  /  ALT  17  /  AlkPhos  58  08-17  
                      11.4   3.17  )-----------( 168      ( 18 Aug 2021 06:31 )             36.1     08-18    140  |  107  |  16  ----------------------------<  96  3.5   |  23  |  0.70    Ca    8.8      18 Aug 2021 06:31  Phos  3.0     08-17  Mg     2.1     08-17    TPro  5.5<L>  /  Alb  3.7  /  TBili  0.9  /  DBili  0.2  /  AST  36  /  ALT  17  /  AlkPhos  58  08-17    

## 2021-08-22 NOTE — BH CONSULTATION LIAISON PROGRESS NOTE - NSBHASSESSMENTFT_PSY_ALL_CORE
50 y/o Mandarin speaking, domiciled, single employed man with recent hospitalization for COVID infection (?mid July) who was BIBEMS for bizarre behavior after his landlord noticed he has been making strange noises at odd hours. Patient does not have any known history of psychiatric issues. Per patient, he has been "cleaning at night since August 11" due to which he has not slept well. Notes he heard noises like "Wooh and ahhhh" and was convinced "witchcraft was happening." Patient states his head has been clouded since his COVID infection, hospitalization and subsequent d/c. Patient also mentioned to the ED team that he was convinced he was being watched and a camera had been placed underneath his bed to spy on him. Patient reports he has been cleaning endlessly trying to locate a pungent odor and states there is "too much garbage around," and insinuates someone sabotaged him and left this garbage in his apartment while he was hospitalized. Patient states he does not speak to his roommate and does not feel this has been the work of his roommate. He is aware of the noise complaints made about him with the landlord but states he must clean, and he "falls everywhere," as he shows multiple small punctures and abrasions on his legs bilaterally. Patient handed provider 4 pages of written notes stating it is a timeline, the content of the note is disorganized and Mandarin is interspersed with English. Patient continues to jot down names of physicians and discharge with text such as " I just sitting in front of TV," while adding what appears to be his hospital course. Denies use of substances. Denies any depressive symptoms but admits to heightened anxiety. Denies any SI/HI/AVH currently and has no plan or intent of self-harm.  --    Patient pleasant and cooperative with presence of persecutory delusions; he tolerated Zyprexa 5 mg PO 1800 well with no acute events/PRN use reported overnight.  Plan is for inpatient psychiatry and pt has 2pc papers in his chart.

## 2021-08-23 PROCEDURE — 99232 SBSQ HOSP IP/OBS MODERATE 35: CPT | Mod: GC

## 2021-08-23 RX ORDER — OLANZAPINE 15 MG/1
1 TABLET, FILM COATED ORAL
Qty: 0 | Refills: 0 | DISCHARGE
Start: 2021-08-23

## 2021-08-23 RX ORDER — OLANZAPINE 15 MG/1
2.5 TABLET, FILM COATED ORAL
Qty: 0 | Refills: 0 | DISCHARGE
Start: 2021-08-23

## 2021-08-23 RX ADMIN — OLANZAPINE 5 MILLIGRAM(S): 15 TABLET, FILM COATED ORAL at 17:24

## 2021-08-23 RX ADMIN — ENOXAPARIN SODIUM 40 MILLIGRAM(S): 100 INJECTION SUBCUTANEOUS at 12:05

## 2021-08-23 NOTE — BH CONSULTATION LIAISON PROGRESS NOTE - NSBHCONSULTRECOMMENDOTHER_PSY_A_CORE FT
Continue Zyprexa to 5mg PO q1800. Please monitor QtC < 500. Psychiatry will continue to follow and will reassess.  Continue Zyprexa 5mg PO q1800. Please monitor QTc < 500. Psychiatry will continue to follow and will reassess.

## 2021-08-23 NOTE — BH CONSULTATION LIAISON PROGRESS NOTE - NSBHCONSULTPRIMARYDISCUSSYES_PSY_A_CORE FT
team aware of recommendations.  Spoke with AIDAN Galdamez today but no psych beds are available.  team aware of recommendations.  Spoke with Ena LOCKE today, no psych beds are available.

## 2021-08-23 NOTE — BH CONSULTATION LIAISON PROGRESS NOTE - NSBHCHARTREVIEWINVESTIGATE_PSY_A_CORE FT
< from: 12 Lead ECG (08.18.21 @ 17:28) >      Ventricular Rate 72 BPM    Atrial Rate 72 BPM    P-R Interval 150 ms    QRS Duration 84 ms    Q-T Interval 402 ms    QTC Calculation(Bazett) 440 ms    P Axis 33 degrees    R Axis 19 degrees    T Axis 23 degrees    Diagnosis Line NORMAL SINUS RHYTHM  NORMAL ECG  WHEN COMPARED WITH ECG OF 16-AUG-2021 10:53,  SIGNIFICANT CHANGES HAVE OCCURRED  Confirmed by MD JESUS, DANY ([id number]) on 8/19/2021 4:44:28 PM    < end of copied text >

## 2021-08-23 NOTE — DISCHARGE NOTE PROVIDER - NSDCMRMEDTOKEN_GEN_ALL_CORE_FT
OLANZapine 10 mg intramuscular injection: 2.5 milligram(s) intramuscular every 6 hours, As needed, Severe Agitation  OLANZapine 2.5 mg oral tablet: 1 tab(s) orally every 6 hours, As needed, Agitation or paranoia  OLANZapine 5 mg oral tablet: 1 tab(s) orally  daily at 1800

## 2021-08-23 NOTE — BH CONSULTATION LIAISON PROGRESS NOTE - NSBHASSESSMENTFT_PSY_ALL_CORE
50 y/o Mandarin speaking, domiciled, single employed man with recent hospitalization for COVID infection (?mid July) who was BIBEMS for bizarre behavior after his landlord noticed he has been making strange noises at odd hours. Patient does not have any known history of psychiatric issues. Per patient, he has been "cleaning at night since August 11" due to which he has not slept well. Notes he heard noises like "Wooh and ahhhh" and was convinced "witchcraft was happening." Patient states his head has been clouded since his COVID infection, hospitalization and subsequent d/c. Patient also mentioned to the ED team that he was convinced he was being watched and a camera had been placed underneath his bed to spy on him. Patient reports he has been cleaning endlessly trying to locate a pungent odor and states there is "too much garbage around," and insinuates someone sabotaged him and left this garbage in his apartment while he was hospitalized. Patient states he does not speak to his roommate and does not feel this has been the work of his roommate. He is aware of the noise complaints made about him with the landlord but states he must clean, and he "falls everywhere," as he shows multiple small punctures and abrasions on his legs bilaterally. Patient handed provider 4 pages of written notes stating it is a timeline, the content of the note is disorganized and Mandarin is interspersed with English. Patient continues to jot down names of physicians and discharge with text such as " I just sitting in front of TV," while adding what appears to be his hospital course. Denies use of substances. Denies any depressive symptoms but admits to heightened anxiety. Denies any SI/HI/AVH currently and has no plan or intent of self-harm.  --    Patient pleasant and cooperative with presence of mild  persecutory delusions; he tolerated Zyprexa 5 mg PO 1800 well with no acute events/PRN use reported overnight.  Plan is for inpatient psychiatry and pt has 2pc papers in his chart.    52 y/o Mandarin speaking, domiciled, single employed man with recent hospitalization for COVID infection (?mid July) who was BIBEMS for bizarre behavior after his landlord noticed he has been making strange noises at odd hours. Patient does not have any known history of psychiatric issues. Per patient, he has been "cleaning at night since August 11" due to which he has not slept well. Notes he heard noises like "Wooh and ahhhh" and was convinced "witchcraft was happening." Patient states his head has been clouded since his COVID infection, hospitalization and subsequent d/c. Patient also mentioned to the ED team that he was convinced he was being watched and a camera had been placed underneath his bed to spy on him. Patient reports he has been cleaning endlessly trying to locate a pungent odor and states there is "too much garbage around," and insinuates someone sabotaged him and left this garbage in his apartment while he was hospitalized. Patient states he does not speak to his roommate and does not feel this has been the work of his roommate. He is aware of the noise complaints made about him with the landlord but states he must clean, and he "falls everywhere," as he shows multiple small punctures and abrasions on his legs bilaterally. Patient handed provider 4 pages of written notes stating it is a timeline, the content of the note is disorganized and Mandarin is interspersed with English. Patient continues to jot down names of physicians and discharge with text such as " I just sitting in front of TV," while adding what appears to be his hospital course. Denies use of substances. Denies any depressive symptoms but admits to heightened anxiety. Denies any SI/HI.  --    Ongoing persecutory delusions appear milder; he tolerated Zyprexa 5 mg PO 1800 well with no acute events/PRN use reported overnight.  Plan is for inpatient psychiatry and pt has 2pc papers in his chart.

## 2021-08-23 NOTE — DISCHARGE NOTE PROVIDER - HOSPITAL COURSE
51 year old male with medical history of recent COVID19 infection (treated at [facility], discharged with home oxygen at beginning of August) and no known psychiatric history who presents to the hospital for agitation. He stated his landlord called EMS after he heard him fighting. He stated there was someone in his room who he could not see who was fighting him. He also admitted to auditory hallucinations to prior providers and described paranoid behavior.        Problem/Plan - 1:  ·  Problem: Psychosis, unspecified psychosis type.  Plan: -Patient with new-onset auditory hallucinations and paranoia after discharge from [facility] for COVID.  -Will request medical records from hospitalization from [facility].  -Appreciate psych recommendations, plan to evaluate for etiology of psychosis (possibly secondary to COVID? vs. steroids?)  -CTA head and neck negative for CVA/hemorrhage  - Neurology consult appreciated  - TSH, B12, Folate WNL  -MR brain, EEG also neg.  - psyc recommending inpatient transfer given delusional paranoia.      Problem/Plan - 2:  ·  Problem: 2019 novel coronavirus disease (COVID-19).  Plan: -Currently comfortable on room air  -monitor  ESR, CRP, ferretin, d-dimer.      Problem/Plan - 3:  ·  Problem: Hyperbilirubinemia.  Plan: -Possibly related to COVID infection  -Follow-up Direct Bili and LDH (in setting of anemia)  -Trend LFTs.      Problem/Plan - 4:  ·  Problem: Anemia.  Plan: -Normocytic anemia  monitor.     Discharge to inpatient psych        51 year old male with medical history of recent COVID19 infection (treated at [facility], discharged with home oxygen at beginning of August) and no known psychiatric history who presents to the hospital for agitation. He stated his landlord called EMS after he heard him fighting. He stated there was someone in his room who he could not see who was fighting him. He also admitted to auditory hallucinations to prior providers and described paranoid behavior.     Psychosis: Patient with new-onset auditory hallucinations and paranoia after discharge from [facility] for COVID.  Requested  medical records from hospitalization from [facility].  Appreciated  Psych recommendations -  to evaluate for etiology of psychosis (possibly secondary to COVID? vs. steroids?)  -CTA head and neck negative for CVA/hemorrhage.  Neurology consult appreciated  TSH, B12, Folate WNL  MR brain, EEG also neg.  Psychiatry  recommended  Inpatient transfer given Delusional Paranoia.     2019 novel coronavirus disease (COVID-19): -Currently comfortable on room air  -monitor  ESR, CRP, ferretin, d-dimer.     Hyperbilirubinemia: -Possibly related to COVID infection  Follow-up Direct Bili and LDH (in setting of anemia)  Trend LFTs.     Anemia: -Normocytic anemia  monitor.     Discharge to inpatient psych        51 year old male with medical history of recent COVID19 infection (treated at [facility], discharged with home oxygen at beginning of August) and no known psychiatric history who presents to the hospital for agitation. He stated his landlord called EMS after he heard him fighting. He stated there was someone in his room who he could not see who was fighting him. He also admitted to auditory hallucinations to prior providers and described paranoid behavior.     Psychosis: Patient with new-onset auditory hallucinations and paranoia after discharge from [facility] for COVID.  Requested  medical records from hospitalization from [facility].  Appreciated  Psych recommendations -  to evaluate for etiology of psychosis (possibly secondary to COVID? vs. steroids?)  -CTA head and neck negative for CVA/hemorrhage.  Neurology consult appreciated  TSH, B12, Folate WNL  MR brain, EEG also neg.  Psychiatry  recommended  Inpatient transfer given Delusional Paranoia.     2019 novel coronavirus disease (COVID-19): -Currently comfortable on room air  -monitor  ESR, CRP, ferretin, d-dimer.     Hyperbilirubinemia: -Possibly related to COVID infection  Follow-up Direct Bili and LDH (in setting of anemia)  Trend LFTs.     Anemia: -Normocytic anemia  Monitor.     Discharge to inpatient Psych at [facility].

## 2021-08-23 NOTE — DISCHARGE NOTE PROVIDER - NSDCFUSCHEDAPPT_GEN_ALL_CORE_FT
Patient Name: Moriah Weeks  : 1960    MRN: [id number]                              Today's Date: 10/18/2019       Admit Date: 10/15/2019    Visit Dx:     ICD-10-CM ICD-9-CM   1. Hypoxia R09.02 799.02   2. AIMEE (obstructive sleep apnea) G47.33 327.23   3. Obstructive sleep apnea syndrome G47.33 327.23     Patient Active Problem List   Diagnosis   • Status post left knee replacement   • Anxiety and depression   • Arthritis   • Hypertension   • Sleep apnea   • Hypoxia   • Constipation     Past Medical History:   Diagnosis Date   • Anxiety and depression    • Arthritis    • Hypertension    • Knee pain, left    • Sleep apnea     DOES NOT WEAR CPAP      Past Surgical History:   Procedure Laterality Date   • CARPAL TUNNEL RELEASE Right    • CARPAL TUNNEL RELEASE Left    • COLONOSCOPY     • ENDOMETRIAL ABLATION     • HYSTERECTOMY     • KNEE ARTHROSCOPY Left 2019    Procedure: LEFT KNEE ARTHROSCOPY WITH PARTIAL MEDIAL MENISCETOMY, CHONDROPLASTY AND INTERNAL FIXATION TIBIAL PLATEAU INSUFFICIENCY FRACTURE;  Surgeon: Aaron Fleming MD;  Location[facility] [place] [facility];  Service: Orthopedics     General Information     Row Name 10/18/19 0913          PT Evaluation Time/Intention    Document Type  therapy note (daily note)  -     Mode of Treatment  physical therapy  -     Row Name 10/18/19 0913          Cognitive Assessment/Intervention- PT/OT    Orientation Status (Cognition)  oriented x 4  -       User Key  (r) = Recorded By, (t) = Taken By, (c) = Cosigned By    Initials Name Provider Type     Elle Machado PTA Physical Therapy Assistant        Mobility     Row Name 10/18/19 0917          Bed Mobility Assessment/Treatment    Comment (Bed Mobility)  up in chair  -     Row Name 10/18/19 0917          Sit-Stand Transfer    Sit-Stand [place] (Transfers)  conditional independence  -     Row Name 10/18/19 0917          Gait/Stairs Assessment/Training    [place] Level (Gait)  supervision  -      Assistive Device (Gait)  walker, front-wheeled  -SM     Distance in Feet (Gait)  500  -SM     Pattern (Gait)  step-through  -SM     Deviations/Abnormal Patterns (Gait)  stride length decreased  -SM     Bilateral Gait Deviations  forward flexed posture  -SM     Comment (Gait/Stairs)  much improved success  -SM       User Key  (r) = Recorded By, (t) = Taken By, (c) = Cosigned By    Initials Name Provider Type    Elle Turcios PTA Physical Therapy Assistant        Obj/Interventions     Row Name 10/18/19 0922          Therapeutic Exercise    Comment (Therapeutic Exercise)  L TKR protocol x 20 reps  -SM       User Key  (r) = Recorded By, (t) = Taken By, (c) = Cosigned By    Initials Name Provider Type    Elle Turcios PTA Physical Therapy Assistant        Goals/Plan    No documentation.       Clinical Impression     Row Name 10/18/19 0923          Pain Assessment    Additional Documentation  Pain Scale: Numbers Pre/Post-Treatment (Group)  -SM     [facility] Name 10/18/19 0923          Pain Scale: Numbers Pre/Post-Treatment    Pain Scale: Numbers, Pretreatment  2/10  -SM     Pain Scale: Numbers, Post-Treatment  2/10  -SM     Pain Location - Side  Left  -SM     Pain Location  knee  -SM     Pain Intervention(s)  Repositioned;Ambulation/increased activity;Rest  -SM     Row Name 10/18/19 0923          Positioning and Restraints    Pre-Treatment Position  sitting in chair/recliner  -SM     Post Treatment Position  chair  -SM     In Chair  reclined;call light within reach;encouraged to call for assist  -SM       User Key  (r) = Recorded By, (t) = Taken By, (c) = Cosigned By    Initials Name Provider Type    Elle Turcios PTA Physical Therapy Assistant        Outcome Measures     Row Name 10/18/19 0927          How much help from another person do you currently need...    Turning from your back to your side while in flat bed without using bedrails?  4  -SM     Moving from lying on back to sitting on the  side of a flat bed without bedrails?  4  -SM     Moving to and from a bed to a chair (including a wheelchair)?  4  -SM     Standing up from a chair using your arms (e.g., wheelchair, bedside chair)?  4  -SM     Climbing 3-5 steps with a railing?  3  -SM     To walk in hospital room?  3  -SM     AM-PAC 6 Clicks Score (PT)  22  -     Row Name 10/18/19 0927          Functional Assessment    Outcome Measure Options  AM-PAC 6 Clicks Basic Mobility (PT)  -       User Key  (r) = Recorded By, (t) = Taken By, (c) = Cosigned By    Initials Name Provider Type    Elle Turcios, PTA Physical Therapy Assistant        Physical Therapy Education     Title: PT OT SLP Therapies (Done)     Topic: Physical Therapy (Done)     Point: Mobility training (Done)     Learning Progress Summary           Patient Acceptance, E,TB,D, VU,NR by  at 10/18/2019  9:23 AM    Acceptance, E,TB,D, VU,NR by  at 10/17/2019  9:22 AM    Acceptance, E, VU by  at 10/17/2019  5:59 AM    Acceptance, E,TB,D, VU by  at 10/16/2019  3:36 PM                   Point: Home exercise program (Done)     Learning Progress Summary           Patient Acceptance, E,TB,D, VU,NR by  at 10/18/2019  9:23 AM    Acceptance, E,TB,D, VU,NR by  at 10/17/2019  9:22 AM    Acceptance, E, VU by CHERRI at 10/17/2019  5:59 AM    Acceptance, E,TB,D, VU by LASHAY at 10/16/2019  3:36 PM                   Point: Body mechanics (Done)     Learning Progress Summary           Patient Acceptance, E,TB,D, VU,NR by  at 10/18/2019  9:23 AM    Acceptance, E,TB,D, VU,NR by  at 10/17/2019  9:22 AM    Acceptance, E, VU by  at 10/17/2019  5:59 AM                   Point: Precautions (Done)     Learning Progress Summary           Patient Acceptance, E,TB,D, VU,NR by  at 10/18/2019  9:23 AM    Acceptance, E,TB,D, VU,NR by  at 10/17/2019  9:22 AM    Acceptance, E, VU by CHERRI at 10/17/2019  5:59 AM                               User Key     Initials Effective Dates Name Provider Type  Discipline     04/03/18 -  Rekha Aaron, PT Physical Therapist PT     03/07/18 -  Elle Machado PTA Physical Therapy Assistant PT     06/16/16 -  Henrietta Balderas RN Registered Nurse Nurse              PT Recommendation and Plan     Outcome Summary/Treatment Plan (PT)  Anticipated Discharge Disposition (PT): home with assist, home with home health  Plan of Care Reviewed With: patient  Progress: improving  Outcome Summary: Pt tolerated treatment well this date. Increased gait distance to 500ft w/ Rw and SV. Able to perform walking oximetry test w/ RT. Pt had no c/o SOA throughout, only taking 1 standing rest break. Plan to d/c home w/ HH soon.     Time Calculation:   PT Charges     Row Name 10/18/19 0927             Time Calculation    Start Time  0840  -      Stop Time  0912  -      Time Calculation (min)  32 min  -      PT Received On  10/18/19  -      PT - Next Appointment  10/21/19  -        User Key  (r) = Recorded By, (t) = Taken By, (c) = Cosigned By    Initials Name Provider Type     Elle Machado PTA Physical Therapy Assistant        Therapy Charges for Today     Code Description Service Date Service Provider Modifiers Qty    72956379996 HC PT THER PROC EA 15 MIN 10/17/2019 Elle Machado, PTA GP 1    17084945393 HC PT THERAPEUTIC ACT EA 15 MIN 10/17/2019 Elle Machado, PAXTON GP 1    49955413848 HC PT THER PROC EA 15 MIN 10/18/2019 Elle Machado, PAXTON GP 1    71729976016 HC PT THERAPEUTIC ACT EA 15 MIN 10/18/2019 Elle Machado, PAXTON GP 1          PT G-Codes  Outcome Measure Options: AM-PAC 6 Clicks Basic Mobility (PT)  AM-PAC 6 Clicks Score (PT): 22    Elle Machado PTA  10/18/2019        BENY ALONZO ; 08/24/2021 ; [facility] PreAdmits

## 2021-08-23 NOTE — DISCHARGE NOTE PROVIDER - NSDCCPCAREPLAN_GEN_ALL_CORE_FT
PRINCIPAL DISCHARGE DIAGNOSIS  Diagnosis: Psychosis  Assessment and Plan of Treatment: Follow-up with psychiatry  Take medication as directed       PRINCIPAL DISCHARGE DIAGNOSIS  Diagnosis: Psychosis  Assessment and Plan of Treatment: Follow-up with Psychiatry  Take medication as directed      SECONDARY DISCHARGE DIAGNOSES  Diagnosis: Anemia  Assessment and Plan of Treatment: Monitor CBC while in Psych Facility.  Monitor fr bleeding.    Diagnosis: Hyperbilirubinemia  Assessment and Plan of Treatment: Monitor LFTs while in Psych Facility.  Follow up with PMD after discharge.

## 2021-08-23 NOTE — BH CONSULTATION LIAISON PROGRESS NOTE - NSBHFUPINTERVALHXFT_PSY_A_CORE
Patient states he has been sleeping well but continues to be an "early riser" because he wakes up at 3am. Patient states he has not thought about witchcraft/voodo today but continues to look at his computer for "answers." Patient noted to be minimizing some of his initial concerns today. Patient continues to feel safe while here and denies any overt SI/HI/AVH and has no plan or intent of self-harm.  Patient states he has been sleeping well but continues to be an "early riser" because he wakes up at 3am. Patient states he has not thought about witchcraft/voodo today but continues to look at his computer for "answers." Patient noted to be minimizing some of his initial concerns today. Denies overt SI/HI/AVH.

## 2021-08-23 NOTE — DISCHARGE NOTE PROVIDER - PROVIDER TOKENS
PROVIDER:[TOKEN:[[id number]]],FREE:[LAST:[.],PHONE:[(   )    -],FAX:[(   )    -],ADDRESS:[Primary care doctor is Dr. Robbins @ [facility] located in  [facility]]]

## 2021-08-23 NOTE — DISCHARGE NOTE PROVIDER - CARE PROVIDER_API CALL
Sadaf Wilcox)  Addiction Psychiatry; ConsultationLiaison Psychiatry; Psychiatry  [address] [place] [address]  Phone: [phone]  Fax: [phone]  Follow Up Time:     .,   Primary care doctor is Dr. Robbins @ [facility] located in  [facility]  Phone: (   )    -  Fax: (   )    -  Follow Up Time:

## 2021-08-24 ENCOUNTER — INPATIENT (INPATIENT)
Facility: HOSPITAL | Age: 51
LOS: 1 days | Discharge: HOME | End: 2021-08-26
Attending: PSYCHIATRY & NEUROLOGY | Admitting: PSYCHIATRY & NEUROLOGY
Payer: MEDICAID

## 2021-08-24 VITALS
HEART RATE: 97 BPM | RESPIRATION RATE: 18 BRPM | TEMPERATURE: 98 F | SYSTOLIC BLOOD PRESSURE: 111 MMHG | OXYGEN SATURATION: 96 % | DIASTOLIC BLOOD PRESSURE: 73 MMHG

## 2021-08-24 VITALS — HEIGHT: 64 IN | TEMPERATURE: 98 F | WEIGHT: 160.06 LBS

## 2021-08-24 DIAGNOSIS — F29 UNSPECIFIED PSYCHOSIS NOT DUE TO A SUBSTANCE OR KNOWN PHYSIOLOGICAL CONDITION: ICD-10-CM

## 2021-08-24 PROBLEM — U07.1 COVID-19: Chronic | Status: ACTIVE | Noted: 2021-08-17

## 2021-08-24 LAB — SARS-COV-2 RNA SPEC QL NAA+PROBE: SIGNIFICANT CHANGE UP

## 2021-08-24 PROCEDURE — 80053 COMPREHEN METABOLIC PANEL: CPT

## 2021-08-24 PROCEDURE — 85652 RBC SED RATE AUTOMATED: CPT

## 2021-08-24 PROCEDURE — 82607 VITAMIN B-12: CPT

## 2021-08-24 PROCEDURE — A9585: CPT

## 2021-08-24 PROCEDURE — 99221 1ST HOSP IP/OBS SF/LOW 40: CPT

## 2021-08-24 PROCEDURE — 70496 CT ANGIOGRAPHY HEAD: CPT | Mod: MA

## 2021-08-24 PROCEDURE — U0005: CPT

## 2021-08-24 PROCEDURE — 84443 ASSAY THYROID STIM HORMONE: CPT

## 2021-08-24 PROCEDURE — 82728 ASSAY OF FERRITIN: CPT

## 2021-08-24 PROCEDURE — 85025 COMPLETE CBC W/AUTO DIFF WBC: CPT

## 2021-08-24 PROCEDURE — 95816 EEG AWAKE AND DROWSY: CPT

## 2021-08-24 PROCEDURE — 93005 ELECTROCARDIOGRAM TRACING: CPT

## 2021-08-24 PROCEDURE — 86140 C-REACTIVE PROTEIN: CPT

## 2021-08-24 PROCEDURE — 84100 ASSAY OF PHOSPHORUS: CPT

## 2021-08-24 PROCEDURE — 80307 DRUG TEST PRSMV CHEM ANLYZR: CPT

## 2021-08-24 PROCEDURE — 85027 COMPLETE CBC AUTOMATED: CPT

## 2021-08-24 PROCEDURE — 99285 EMERGENCY DEPT VISIT HI MDM: CPT

## 2021-08-24 PROCEDURE — 70498 CT ANGIOGRAPHY NECK: CPT | Mod: MA

## 2021-08-24 PROCEDURE — 82248 BILIRUBIN DIRECT: CPT

## 2021-08-24 PROCEDURE — 82746 ASSAY OF FOLIC ACID SERUM: CPT

## 2021-08-24 PROCEDURE — 83615 LACTATE (LD) (LDH) ENZYME: CPT

## 2021-08-24 PROCEDURE — 83735 ASSAY OF MAGNESIUM: CPT

## 2021-08-24 PROCEDURE — 86769 SARS-COV-2 COVID-19 ANTIBODY: CPT

## 2021-08-24 PROCEDURE — U0003: CPT

## 2021-08-24 PROCEDURE — 80048 BASIC METABOLIC PNL TOTAL CA: CPT

## 2021-08-24 PROCEDURE — 99233 SBSQ HOSP IP/OBS HIGH 50: CPT | Mod: GC

## 2021-08-24 PROCEDURE — 86780 TREPONEMA PALLIDUM: CPT

## 2021-08-24 PROCEDURE — 85379 FIBRIN DEGRADATION QUANT: CPT

## 2021-08-24 PROCEDURE — 70553 MRI BRAIN STEM W/O & W/DYE: CPT

## 2021-08-24 PROCEDURE — 71045 X-RAY EXAM CHEST 1 VIEW: CPT

## 2021-08-24 PROCEDURE — 81001 URINALYSIS AUTO W/SCOPE: CPT

## 2021-08-24 RX ORDER — OLANZAPINE 15 MG/1
5 TABLET, FILM COATED ORAL ONCE
Refills: 0 | Status: COMPLETED | OUTPATIENT
Start: 2021-08-24 | End: 2021-08-24

## 2021-08-24 RX ORDER — PREGABALIN 225 MG/1
1000 CAPSULE ORAL DAILY
Refills: 0 | Status: DISCONTINUED | OUTPATIENT
Start: 2021-08-24 | End: 2021-08-24

## 2021-08-24 RX ORDER — OLANZAPINE 15 MG/1
5 TABLET, FILM COATED ORAL DAILY
Refills: 0 | Status: DISCONTINUED | OUTPATIENT
Start: 2021-08-24 | End: 2021-08-26

## 2021-08-24 RX ADMIN — ENOXAPARIN SODIUM 40 MILLIGRAM(S): 100 INJECTION SUBCUTANEOUS at 11:39

## 2021-08-24 RX ADMIN — OLANZAPINE 5 MILLIGRAM(S): 15 TABLET, FILM COATED ORAL at 18:03

## 2021-08-24 RX ADMIN — OLANZAPINE 5 MILLIGRAM(S): 15 TABLET, FILM COATED ORAL at 16:34

## 2021-08-24 RX ADMIN — Medication 2 MILLIGRAM(S): at 18:03

## 2021-08-24 NOTE — BH CONSULTATION LIAISON PROGRESS NOTE - NSBHMSETHTCONTENT_PSY_A_CORE
Delusions/Preoccupations/Ruminations/Other
Preoccupations/Other
Delusions/Preoccupations/Ruminations/Other
Preoccupations/Other
Preoccupations/Ruminations/Other
Delusions/Preoccupations/Ruminations/Other
Delusions/Preoccupations/Ruminations/Other

## 2021-08-24 NOTE — PROGRESS NOTE ADULT - PROBLEM SELECTOR PROBLEM 3
Hyperbilirubinemia

## 2021-08-24 NOTE — BH INPATIENT PSYCHIATRY ASSESSMENT NOTE - HPI (INCLUDE ILLNESS QUALITY, SEVERITY, DURATION, TIMING, CONTEXT, MODIFYING FACTORS, ASSOCIATED SIGNS AND SYMPTOMS)
50 y/o Mandarin speaking, domiciled, single employed w/p recent hospitalization of COVID infection (?mid July) who was BIBEMS to [facility] for [facility] after landlord noticed he has been making strange noises at odd hours. Of note patient does not have history of psychiatric diagnoses. Per patient he has been "cleaning at night since August 11" due to which he has not slept well. Notes he heard noises like "Wooh and ahhhh" and is convinced "witchcraft is happening." Patient states his head has been clouded since his COVID infection, hospitalization and subsequent d/c. Patient also mentioned to the ED team that he was convinced he was being watched and a camera had been placed underneath his bed to spy on him. Patient reported he has been cleaning endlessly trying to locate an pungent odor and states there is "too much garbage around," and insinuates someone sabotaged him and left this garbage in his apartment while he was hospitalized. Patient stated he does not speak to his roommate and does not feel this has been the work of his roommate. He was aware of the noise complaints made about him with the landlord but states he must clean, or he "falls everywhere,' as he shows multiple small punctures and abrasions on his legs bilaterally. Patient was admitted   CTA head and neck negative for CVA/hemorrhage  - Neurology consult done  - TSH, , Folate WNL  - B12 low normal : will add b12   -MR brain, EEG also neg.  Patient was treated with olanzapine 5 mg, improved, but remained disorganized, transferred on 2 PC for continued psychiatric care.

## 2021-08-24 NOTE — BH CONSULTATION LIAISON PROGRESS NOTE - MODIFICATIONS
Modifications as above and below
Modifications as above and below
as documented 
Modifications as above and below

## 2021-08-24 NOTE — BH CONSULTATION LIAISON PROGRESS NOTE - NSBHCONSULTRECOMMENDOTHER_PSY_A_CORE FT
Can consider increasing Zyprexa to 7.5 mg PO q1800. Please monitor QTc < 500. Psychiatry will continue to follow and will reassess.  Increase Zyprexa to 7.5 mg PO q1800. Please monitor QTc < 500.    2PC to inpt psych pending bed

## 2021-08-24 NOTE — PROGRESS NOTE ADULT - SUBJECTIVE AND OBJECTIVE BOX
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  (STANDING):  enoxaparin Injectable 40 milliGRAM(s) SubCutaneous daily  OLANZapine 5 milliGRAM(s) Oral <User Schedule>    MEDICATIONS  (PRN):  OLANZapine 2.5 milliGRAM(s) Oral every 6 hours PRN Agitation or paranoia  OLANZapine Injectable 2.5 milliGRAM(s) IntraMuscular every 6 hours PRN Severe Agitation    Vital Signs Last 24 Hrs  T(C): 36.8 (20 Aug 2021 10:47), Max: 36.8 (19 Aug 2021 21:12)  T(F): 98.3 (20 Aug 2021 10:47), Max: 98.3 (20 Aug 2021 10:47)  HR: 76 (20 Aug 2021 10:47) (62 - 76)  BP: 106/70 (20 Aug 2021 10:47) (101/66 - 106/70)  BP(mean): --  RR: 18 (20 Aug 2021 10:47) (18 - 18)  SpO2: 97% (20 Aug 2021 10:47) (96% - 97%)    On Neurological Examination:    Mental Status - Patient is alert, awake, oriented X3. fluent, names, no dysarthria no aphasia Follows commands well and able to answer questions appropriately. Mood and affect  normal    Cranial Nerves - PERRL, EOMI, VFF, V1-V3 intact, no gross facial asymmetry, tongue/uvula midline    Motor Exam -   no drift 5/5 can squat     nml bulk/tone    Sensory    Intact to light touch and pinprick bilaterally    Coord: FTN intact bilaterally     Gait -  normal      Reflexes:        deferred                                             LABS:  CBC Full  -  ( 17 Aug 2021 08:10 )  WBC Count : 4.44 K/uL  RBC Count : 3.97 M/uL  Hemoglobin : 11.8 g/dL  Hematocrit : 37.2 %  Platelet Count - Automated : 190 K/uL  Mean Cell Volume : 93.7 fl  Mean Cell Hemoglobin : 29.7 pg  Mean Cell Hemoglobin Concentration : 31.7 gm/dL  Auto Neutrophil # : x  Auto Lymphocyte # : x  Auto Monocyte # : x  Auto Eosinophil # : x  Auto Basophil # : x  Auto Neutrophil % : x  Auto Lymphocyte % : x  Auto Monocyte % : x  Auto Eosinophil % : x  Auto Basophil % : x    Urinalysis Basic - ( 16 Aug 2021 13:14 )    Color: Dark Yellow / Appearance: Turbid / S.038 / pH: x  Gluc: x / Ketone: Moderate  / Bili: Small / Urobili: Negative   Blood: x / Protein: 30 mg/dL / Nitrite: Negative   Leuk Esterase: Negative / RBC: 1 /hpf / WBC 3 /HPF   Sq Epi: x / Non Sq Epi: 2 / Bacteria: Moderate          142  |  108  |  27<H>  ----------------------------<  104<H>  3.9   |  20<L>  |  0.74    Ca    8.8      17 Aug 2021 08:10  Phos  3.0     -  Mg     2.1     -    TPro  5.5<L>  /  Alb  3.7  /  TBili  0.9  /  DBili  0.2  /  AST  36  /  ALT  17  /  AlkPhos  58  -    LIVER FUNCTIONS - ( 17 Aug 2021 08:10 )  Alb: 3.7 g/dL / Pro: 5.5 g/dL / ALK PHOS: 58 U/L / ALT: 17 U/L / AST: 36 U/L / GGT: x           Hemoglobin A1C:     Vitamin B12, Serum: 330 pg/mL ( @ 04:12)          RADIOLOGY  < from: CT Angio Head w/ IV Cont (21 @ 15:13) >  IMPRESSION: Normal brain CT.Normal CTA of the head and neck. Abnormal lung apices consistent with sequela of Covid infection.    --- End of Report ---                NAMAN STAHL MD; Attending Radiologist  This document has been electronically signed. Aug 16 2021  3:23PM    < end of copied text >  < from: MR Head w/wo IV Cont (21 @ 22:10) >    IMPRESSION:    No acute intracranial pathology.    --- End of Report ---    < end of copied text >  EEG Summary / Classification:  Normal EEG     EEG Impression / Clinical Correlate:  Normal EEG study.  No epileptiform pattern or seizure seen.    Cezar Cote MD  Attending Physician, [facility] [place]                
SUBJECTIVE / OVERNIGHT EVENTS:  --- Coverage for Dr. Castillo ---   Pt seen and examined at bedside.   No overnight event.  Feeling better.  no cp, no sob, no n/v/d.   mental status much better  Pt denied SI/HI ideations, denied visual and auditory hallucinations.   psyc following  Zyprexa dose adjusted yesterday         --------------------------------------------------------------------------------------------  LABS:                        13.3   3.79  )-----------( 202      ( 20 Aug 2021 06:46 )             41.8     08-20    141  |  105  |  13  ----------------------------<  101<H>  4.3   |  27  |  0.78    Ca    9.4      20 Aug 2021 06:46  Mg     2.3     08-20    TPro  5.9<L>  /  Alb  4.1  /  TBili  0.8  /  DBili  x   /  AST  22  /  ALT  16  /  AlkPhos  42  08-20      CAPILLARY BLOOD GLUCOSE                RADIOLOGY & ADDITIONAL TESTS:    Imaging Personally Reviewed:  [x] YES  [ ] NO    Consultant(s) Notes Reviewed:  [x] YES  [ ] NO    MEDICATIONS  (STANDING):  enoxaparin Injectable 40 milliGRAM(s) SubCutaneous daily  OLANZapine 5 milliGRAM(s) Oral <User Schedule>    MEDICATIONS  (PRN):  OLANZapine 2.5 milliGRAM(s) Oral every 6 hours PRN Agitation or paranoia  OLANZapine Injectable 2.5 milliGRAM(s) IntraMuscular every 6 hours PRN Severe Agitation      Care Discussed with Consultants/Other Providers [x] YES  [ ] NO    Vital Signs Last 24 Hrs  T(C): 36.8 (20 Aug 2021 10:47), Max: 36.8 (20 Aug 2021 10:47)  T(F): 98.3 (20 Aug 2021 10:47), Max: 98.3 (20 Aug 2021 10:47)  HR: 76 (20 Aug 2021 10:47) (62 - 76)  BP: 106/70 (20 Aug 2021 10:47) (101/66 - 106/70)  BP(mean): --  RR: 18 (20 Aug 2021 10:47) (18 - 18)  SpO2: 97% (20 Aug 2021 10:47) (96% - 97%)  I&O's Summary    19 Aug 2021 07:01  -  20 Aug 2021 07:00  --------------------------------------------------------  IN: 1320 mL / OUT: 0 mL / NET: 1320 mL    20 Aug 2021 07:01  -  20 Aug 2021 22:47  --------------------------------------------------------  IN: 720 mL / OUT: 0 mL / NET: 720 mL      PHYSICAL EXAM:  GENERAL: NAD, well-developed, comfortable  HEAD:  Atraumatic, Normocephalic  EYES: EOMI, PERRLA, conjunctiva and sclera clear  NECK: Supple, No JVD  CHEST/LUNG: Clear to auscultation bilaterally; No wheeze  HEART: Regular rate and rhythm; No murmurs, rubs, or gallops  ABDOMEN: Soft, Nontender, Nondistended; Bowel sounds present  Neuro: AAOx3, no focal weakness, 5/5 b/l extremity strength  EXTREMITIES:  2+ Peripheral Pulses, No clubbing, cyanosis, or edema  SKIN: No rashes or lesions   
SUBJECTIVE / OVERNIGHT EVENTS:  --- Coverage for Dr. Castillo ---   Pt sitting up out of bed.  feels well.  no chest pain, no shortness of breath.   comfortable. no n/v/d. no abd pain.  no HA/dizziness.           --------------------------------------------------------------------------------------------  LABS:            CAPILLARY BLOOD GLUCOSE                RADIOLOGY & ADDITIONAL TESTS:    Imaging Personally Reviewed:  [x] YES  [ ] NO    Consultant(s) Notes Reviewed:  [x] YES  [ ] NO    MEDICATIONS  (STANDING):  enoxaparin Injectable 40 milliGRAM(s) SubCutaneous daily  OLANZapine 5 milliGRAM(s) Oral <User Schedule>    MEDICATIONS  (PRN):  OLANZapine 2.5 milliGRAM(s) Oral every 6 hours PRN Agitation or paranoia  OLANZapine Injectable 2.5 milliGRAM(s) IntraMuscular every 6 hours PRN Severe Agitation      Care Discussed with Consultants/Other Providers [x] YES  [ ] NO    Vital Signs Last 24 Hrs  T(C): 36.9 (22 Aug 2021 12:21), Max: 36.9 (22 Aug 2021 12:21)  T(F): 98.4 (22 Aug 2021 12:21), Max: 98.4 (22 Aug 2021 12:21)  HR: 91 (22 Aug 2021 12:21) (73 - 91)  BP: 110/72 (22 Aug 2021 12:21) (110/72 - 112/74)  BP(mean): --  RR: 18 (22 Aug 2021 12:21) (18 - 18)  SpO2: 96% (22 Aug 2021 12:21) (96% - 97%)  I&O's Summary    21 Aug 2021 07:01  -  22 Aug 2021 07:00  --------------------------------------------------------  IN: 1020 mL / OUT: 0 mL / NET: 1020 mL        PHYSICAL EXAM:  GENERAL: NAD, well-developed, comfortable  HEAD:  Atraumatic, Normocephalic  EYES: EOMI, PERRLA, conjunctiva and sclera clear  NECK: Supple, No JVD  CHEST/LUNG: Clear to auscultation bilaterally; No wheeze  HEART: Regular rate and rhythm; No murmurs, rubs, or gallops  ABDOMEN: Soft, Nontender, Nondistended; Bowel sounds present  Neuro: AAOx3, no focal weakness, 5/5 b/l extremity strength  EXTREMITIES:  2+ Peripheral Pulses, No clubbing, cyanosis, or edema  SKIN: No rashes or lesions   
Date of service: 08-23-21 @ 13:08      Patient is a 51y old  Male who presents with a chief complaint of Agitation (23 Aug 2021 12:55)                                                               INTERVAL HPI/OVERNIGHT EVENTS:    REVIEW OF SYSTEMS:     CONSTITUTIONAL: No weakness, fevers or chills  EYES/ENT: No visual changes , no ear ache   NECK: No pain or stiffness  RESPIRATORY: No cough, wheezing,  No shortness of breath  CARDIOVASCULAR: No chest pain or palpitations  GASTROINTESTINAL: No abdominal pain  . No nausea, vomiting, or hematemesis; No diarrhea or constipation. No melena or hematochezia.  GENITOURINARY: No dysuria, frequency or hematuria  NEUROLOGICAL: No numbness or weakness  SKIN: No itching, burning, rashes, or lesions                                                                                                                                                                                                                                                                                 Medications:  MEDICATIONS  (STANDING):  enoxaparin Injectable 40 milliGRAM(s) SubCutaneous daily  OLANZapine 5 milliGRAM(s) Oral <User Schedule>    MEDICATIONS  (PRN):  OLANZapine 2.5 milliGRAM(s) Oral every 6 hours PRN Agitation or paranoia  OLANZapine Injectable 2.5 milliGRAM(s) IntraMuscular every 6 hours PRN Severe Agitation       Allergies    No Known Allergies    Intolerances      Vital Signs Last 24 Hrs  T(C): 36.8 (23 Aug 2021 11:10), Max: 36.9 (22 Aug 2021 19:37)  T(F): 98.3 (23 Aug 2021 11:10), Max: 98.5 (22 Aug 2021 19:37)  HR: 91 (23 Aug 2021 11:10) (77 - 91)  BP: 109/73 (23 Aug 2021 11:10) (105/70 - 109/78)  BP(mean): --  RR: 18 (23 Aug 2021 11:10) (17 - 18)  SpO2: 98% (23 Aug 2021 11:10) (97% - 98%)  CAPILLARY BLOOD GLUCOSE          08-22 @ 07:01  -  08-23 @ 07:00  --------------------------------------------------------  IN: 1270 mL / OUT: 0 mL / NET: 1270 mL      Physical Exam:    Daily     Daily   General:  Well appearing, NAD, not cachetic  HEENT:  Nonicteric, PERRLA  CV:  RRR, S1S2   Lungs:  CTA B/L, no wheezes, rales, rhonchi  Abdomen:  Soft, non-tender, no distended, positive BS  Extremities:  2+ pulses, no c/c, no edema  Skin:  Warm and dry, no rashes  :  No rosen  Neuro:  AAOx3, non-focal, grossly intact                                                                                                                                                                                                                                                                                                LABS:                                                     RADIOLOGY & ADDITIONAL TESTS         I personally reviewed: [  ]EKG   [  ]CXR    [  ] CT      A/P:         Discussed with :     Teresa consultants' Notes   Time spent :
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
## 2021-08-24 NOTE — BH CONSULTATION LIAISON PROGRESS NOTE - NSBHFUPINTERVALHXFT_PSY_A_CORE
Patient seen with Dr. De La Cruz at bedside, seen with a stack of paper where he has been journaling his thoughts. Patient continues to minimize some of his concerns but noted to be visibly anxious whenever asked about the events leading up to his admission. Notes he slept well last night and notices this is very different from his sleep pattern at home ( he was averaging one hour of sleep while cleaning all night). Notes his apartment is" very dirty" but unable to state what led up to this. He states "maybe it was in my head" but tries to switch the subject whenever we speak about the auditory hallucinations which led up to this admission. Patient shows provider his notes which consist of chinese characters interspersed with english. He has written out the address of this hospital multiple time and offers the phone numbers of two of his friends which he recovered; He is agreeable to allowing us to contact his friends should we need additional collateral history ( Briana [phone] and Rachael [phone]). Staff observing him has noted he is constantly cleaning and seems restless. Denies overt SI/HI/AVH currently and has no plan/intent of self-harm.  Patient seen with Dr. De La Cruz at bedside, seen with a stack of paper where he has been journaling his thoughts. Patient continues to minimize some of his concerns but noted to be visibly anxious whenever asked about the events leading up to his admission. Notes he slept well last night and notices this is very different from his sleep pattern at home ( he was averaging one hour of sleep while cleaning all night). Notes his apartment is "very dirty" but unable to state what led up to this. He states "maybe it was in my head" but tries to switch the subject whenever we speak about the auditory hallucinations which led up to this admission. Patient shows provider his notes which consist of chinese characters interspersed with english. He has written out the address of this hospital multiple time and offers the phone numbers of two of his friends which he recovered; He is agreeable to allowing us to contact his friends should we need additional collateral history ( Briana [phone] and Rachael [phone]). Staff observing him has noted he is constantly cleaning and seems restless. Denies overt SI/HI/AVH.

## 2021-08-24 NOTE — BH INPATIENT PSYCHIATRY ASSESSMENT NOTE - DESCRIPTION
Resides in apartment with roommate who he is not close to. Family is in [place] and does not have a significant other. Is employed as a

## 2021-08-24 NOTE — DISCHARGE NOTE NURSING/CASE MANAGEMENT/SOCIAL WORK - NSDCPEFALRISK_GEN_ALL_CORE
For information on Fall & injury Prevention, visit [email or web address]

## 2021-08-24 NOTE — BH CONSULTATION LIAISON PROGRESS NOTE - NSBHMSETHTPROC_PSY_A_CORE
Disorganized/Tangential/Impaired reasoning
Disorganized/Tangential/Impaired reasoning
Linear/Impaired reasoning
Disorganized/Tangential/Impaired reasoning
Linear/Impaired reasoning
Disorganized/Tangential/Impaired reasoning
Linear/Impaired reasoning

## 2021-08-24 NOTE — BH CONSULTATION LIAISON PROGRESS NOTE - NSBHPTASSESSDT_PSY_A_CORE
18-Aug-2021 09:07
21-Aug-2021 16:40
24-Aug-2021 09:53
22-Aug-2021 12:49
17-Aug-2021 14:25
19-Aug-2021 16:30
23-Aug-2021 16:27

## 2021-08-24 NOTE — BH PATIENT PROFILE - HOME MEDICATIONS
OLANZapine 10 mg intramuscular injection , 2.5 milligram(s) intramuscular every 6 hours, As needed, Severe Agitation  OLANZapine 5 mg oral tablet , 1 tab(s) orally  daily at 1800  OLANZapine 2.5 mg oral tablet , 1 tab(s) orally every 6 hours, As needed, Agitation or paranoia

## 2021-08-24 NOTE — BH CONSULTATION LIAISON PROGRESS NOTE - CURRENT MEDICATION
MEDICATIONS  (STANDING):  enoxaparin Injectable 40 milliGRAM(s) SubCutaneous daily  OLANZapine 5 milliGRAM(s) Oral <User Schedule>    MEDICATIONS  (PRN):  OLANZapine 2.5 milliGRAM(s) Oral every 6 hours PRN Agitation or paranoia  OLANZapine Injectable 2.5 milliGRAM(s) IntraMuscular every 6 hours PRN Severe Agitation  
MEDICATIONS  (STANDING):  enoxaparin Injectable 40 milliGRAM(s) SubCutaneous daily    MEDICATIONS  (PRN):  
MEDICATIONS  (STANDING):  enoxaparin Injectable 40 milliGRAM(s) SubCutaneous daily  OLANZapine 5 milliGRAM(s) Oral <User Schedule>    MEDICATIONS  (PRN):  OLANZapine 2.5 milliGRAM(s) Oral every 6 hours PRN Agitation or paranoia  OLANZapine Injectable 2.5 milliGRAM(s) IntraMuscular every 6 hours PRN Severe Agitation  
MEDICATIONS  (STANDING):  enoxaparin Injectable 40 milliGRAM(s) SubCutaneous daily  OLANZapine 2.5 milliGRAM(s) Oral <User Schedule>    MEDICATIONS  (PRN):  OLANZapine 2.5 milliGRAM(s) Oral every 6 hours PRN Agitation or paranoia  OLANZapine Injectable 2.5 milliGRAM(s) IntraMuscular every 6 hours PRN Severe Agitation  
MEDICATIONS  (STANDING):  enoxaparin Injectable 40 milliGRAM(s) SubCutaneous daily  magnesium sulfate  IVPB 1 Gram(s) IV Intermittent once  OLANZapine 5 milliGRAM(s) Oral <User Schedule>  potassium chloride    Tablet ER 20 milliEquivalent(s) Oral once    MEDICATIONS  (PRN):  OLANZapine 2.5 milliGRAM(s) Oral every 6 hours PRN Agitation or paranoia  OLANZapine Injectable 2.5 milliGRAM(s) IntraMuscular every 6 hours PRN Severe Agitation

## 2021-08-24 NOTE — BH CONSULTATION LIAISON PROGRESS NOTE - NSBHASSESSMENTFT_PSY_ALL_CORE
50 y/o Mandarin speaking, domiciled, single employed man with recent hospitalization for COVID infection (?mid July) who was BIBEMS for bizarre behavior after his landlord noticed he has been making strange noises at odd hours. Patient does not have any known history of psychiatric issues. Per patient, he has been "cleaning at night since August 11" due to which he has not slept well. Notes he heard noises like "Wooh and ahhhh" and was convinced "witchcraft was happening." Patient states his head has been clouded since his COVID infection, hospitalization and subsequent d/c. Patient also mentioned to the ED team that he was convinced he was being watched and a camera had been placed underneath his bed to spy on him. Patient reports he has been cleaning endlessly trying to locate a pungent odor and states there is "too much garbage around," and insinuates someone sabotaged him and left this garbage in his apartment while he was hospitalized. Patient states he does not speak to his roommate and does not feel this has been the work of his roommate. He is aware of the noise complaints made about him with the landlord but states he must clean, and he "falls everywhere," as he shows multiple small punctures and abrasions on his legs bilaterally. Patient handed provider 4 pages of written notes stating it is a timeline, the content of the note is disorganized and Mandarin is interspersed with English. Patient continues to jot down names of physicians and discharge with text such as " I just sitting in front of TV," while adding what appears to be his hospital course. Denies use of substances. Denies any depressive symptoms but admits to heightened anxiety. Denies any SI/HI.  --    Ongoing persecutory delusions appear milder with still present, Patient continues to minimize symptoms of psychosis/ paranoia. Seen with written notes charting addresses and dates similar to the documentation he had shown provider when first admitted. Can consider further optimization of Zyprexa to 7.5 mg PO 1800.   Plan is for inpatient psychiatry and pt has 2pc papers in his chart.    50 y/o Mandarin speaking, domiciled, single employed man with recent hospitalization for COVID infection (?mid July) who was BIBEMS for bizarre behavior after his landlord noticed he has been making strange noises at odd hours. Patient does not have any known history of psychiatric issues. Per patient, he has been "cleaning at night since August 11" due to which he has not slept well. Notes he heard noises like "Wooh and ahhhh" and was convinced "witchcraft was happening." Patient states his head has been clouded since his COVID infection, hospitalization and subsequent d/c. Patient also mentioned to the ED team that he was convinced he was being watched and a camera had been placed underneath his bed to spy on him. Patient reports he has been cleaning endlessly trying to locate a pungent odor and states there is "too much garbage around," and insinuates someone sabotaged him and left this garbage in his apartment while he was hospitalized. Patient states he does not speak to his roommate and does not feel this has been the work of his roommate. He is aware of the noise complaints made about him with the landlord but states he must clean, and he "falls everywhere," as he shows multiple small punctures and abrasions on his legs bilaterally. Patient handed provider 4 pages of written notes stating it is a timeline, the content of the note is disorganized and Mandarin is interspersed with English. Patient continues to jot down names of physicians and discharge with text such as " I just sitting in front of TV," while adding what appears to be his hospital course. Denies use of substances. Denies any depressive symptoms but admits to heightened anxiety. Denies any SI/HI.  --    Ongoing persecutory delusions, Patient continues to minimize symptoms of psychosis/ paranoia. Seen with written notes charting addresses and dates similar to the documentation he had shown provider when first admitted. Can consider further optimization of Zyprexa to 7.5 mg PO 1800.   Plan is for inpatient psychiatry and pt has 2pc papers in his chart.

## 2021-08-24 NOTE — DISCHARGE NOTE NURSING/CASE MANAGEMENT/SOCIAL WORK - PATIENT PORTAL LINK FT
You can access the FollowMyHealth Patient Portal offered by [facility] by registering at the following website: [email or web address]. By joining [employer]’s FollowMyHealth portal, you will also be able to view your health information using other applications (apps) compatible with our system.

## 2021-08-24 NOTE — PROGRESS NOTE ADULT - PROBLEM SELECTOR PLAN 4
-Normocytic anemia  monitor
-Normocytic anemia  monitor
-Normocytic anemia  -Will check LDH given elevated bili
-Normocytic anemia  monitor

## 2021-08-24 NOTE — PROVIDER CONTACT NOTE (OTHER) - SITUATION
pt to be transferred to [facility] , refusing to get on stretcher and agree to transfer
Spoke with dick, patient was admitted to [facility] a month ago for covid, spent 2 weeks there, and was discharge beginning of August. Patient asymptomatic, here for psych issues. No need for isolation.

## 2021-08-24 NOTE — BH CONSULTATION LIAISON PROGRESS NOTE - NSBHCHARTREVIEWVS_PSY_A_CORE FT
Vital Signs Last 24 Hrs  T(C): 36.6 (24 Aug 2021 04:25), Max: 37.1 (23 Aug 2021 20:34)  T(F): 97.8 (24 Aug 2021 04:25), Max: 98.8 (23 Aug 2021 20:34)  HR: 72 (24 Aug 2021 04:25) (72 - 91)  BP: 101/65 (24 Aug 2021 04:25) (100/67 - 109/73)  BP(mean): --  RR: 18 (24 Aug 2021 04:25) (18 - 18)  SpO2: 98% (24 Aug 2021 04:25) (97% - 98%)
Vital Signs Last 24 Hrs  T(C): 36.4 (18 Aug 2021 05:44), Max: 37.2 (17 Aug 2021 13:10)  T(F): 97.5 (18 Aug 2021 05:44), Max: 98.9 (17 Aug 2021 13:10)  HR: 80 (18 Aug 2021 05:44) (70 - 80)  BP: 102/69 (18 Aug 2021 05:44) (100/64 - 114/77)  BP(mean): --  RR: 18 (18 Aug 2021 05:44) (17 - 18)  SpO2: 95% (18 Aug 2021 05:44) (94% - 96%)
Vital Signs Last 24 Hrs  T(C): 36.7 (21 Aug 2021 11:25), Max: 36.7 (21 Aug 2021 11:25)  T(F): 98.1 (21 Aug 2021 11:25), Max: 98.1 (21 Aug 2021 11:25)  HR: 82 (21 Aug 2021 11:25) (69 - 82)  BP: 111/77 (21 Aug 2021 11:25) (106/72 - 111/77)  BP(mean): --  RR: 18 (21 Aug 2021 11:25) (18 - 18)  SpO2: 96% (21 Aug 2021 11:25) (96% - 97%)
Vital Signs Last 24 Hrs  T(C): 37.2 (17 Aug 2021 13:10), Max: 37.2 (17 Aug 2021 13:10)  T(F): 98.9 (17 Aug 2021 13:10), Max: 98.9 (17 Aug 2021 13:10)  HR: 80 (17 Aug 2021 13:10) (72 - 100)  BP: 114/77 (17 Aug 2021 13:10) (104/73 - 129/78)  BP(mean): 95 (16 Aug 2021 23:00) (95 - 95)  RR: 17 (17 Aug 2021 13:10) (16 - 18)  SpO2: 94% (17 Aug 2021 13:10) (94% - 97%)
Vital Signs Last 24 Hrs  T(C): 36.9 (22 Aug 2021 12:21), Max: 36.9 (22 Aug 2021 12:21)  T(F): 98.4 (22 Aug 2021 12:21), Max: 98.4 (22 Aug 2021 12:21)  HR: 91 (22 Aug 2021 12:21) (73 - 91)  BP: 110/72 (22 Aug 2021 12:21) (110/72 - 112/74)  BP(mean): --  RR: 18 (22 Aug 2021 12:21) (18 - 18)  SpO2: 96% (22 Aug 2021 12:21) (96% - 97%)
Vital Signs Last 24 Hrs  T(C): 36.8 (23 Aug 2021 11:10), Max: 36.9 (22 Aug 2021 19:37)  T(F): 98.3 (23 Aug 2021 11:10), Max: 98.5 (22 Aug 2021 19:37)  HR: 91 (23 Aug 2021 11:10) (77 - 91)  BP: 109/73 (23 Aug 2021 11:10) (105/70 - 109/78)  BP(mean): --  RR: 18 (23 Aug 2021 11:10) (17 - 18)  SpO2: 98% (23 Aug 2021 11:10) (97% - 98%)
Vital Signs Last 24 Hrs  T(C): 36.7 (19 Aug 2021 11:42), Max: 36.9 (18 Aug 2021 20:27)  T(F): 98.1 (19 Aug 2021 11:42), Max: 98.5 (18 Aug 2021 20:27)  HR: 65 (19 Aug 2021 04:25) (63 - 65)  BP: 111/74 (19 Aug 2021 11:42) (101/64 - 111/74)  BP(mean): --  RR: 18 (19 Aug 2021 11:42) (18 - 18)  SpO2: 97% (19 Aug 2021 11:42) (94% - 97%)

## 2021-08-24 NOTE — PROGRESS NOTE ADULT - PROBLEM SELECTOR PLAN 2
-Currently comfortable on room air  -monitor  ESR, CRP, ferretin, d-dimer
-Currently comfortable on room air  -Follow-up inflammatory markers in AM- ESR, CRP, ferretin, d-dimer

## 2021-08-24 NOTE — BH INPATIENT PSYCHIATRY ASSESSMENT NOTE - MSE UNSTRUCTURED FT
PT is awake and alert. Affect is neutral. Good attention. Gives coherent narrative of recent events. THought process is organized, linear, relevant, coherent  No delusions expressed, reports "I had behaviors like acting too precise and illusory things happened to me." Denies depressive symptoms. No suicidal  ideations.

## 2021-08-24 NOTE — PROGRESS NOTE ADULT - PROBLEM SELECTOR PLAN 1
-Patient with new-onset auditory hallucinations and paranoia after discharge from [facility] for COVID.  -Will request medical records from hospitalization from [facility].  -Appreciate psych recommendations, plan to evaluate for etiology of psychosis (possibly secondary to COVID? vs. steroids?)  -CTA head and neck negative for CVA/hemorrhage  - Neurology consult appreciated  - TSH, B12, Folate WNL  -MR brain, EEG also neg.
-Patient with new-onset auditory hallucinations and paranoia after discharge from [facility] for COVID.  -Will request medical records from hospitalization from [facility].  -Appreciate psych recommendations, plan to evaluate for etiology of psychosis (possibly secondary to COVID?)  -CTA head and neck negative for CVA/hemorrhage  -Will obtain Neurology consult  -Follow-up B12, Folate  -MR brain
-Patient with new-onset auditory hallucinations and paranoia after discharge from [facility] for COVID.  -Will request medical records from hospitalization from [facility].  -Appreciate psych recommendations, plan to evaluate for etiology of psychosis (possibly secondary to COVID?)  -CTA head and neck negative for CVA/hemorrhage  -fu  Neurology consult  - B12, Folate  -MR brain, EEG pending
-Patient with new-onset auditory hallucinations and paranoia after discharge from [facility] for COVID.  -Will request medical records from hospitalization from [facility].  -Appreciate psych recommendations, plan to evaluate for etiology of psychosis (possibly secondary to COVID? vs. steroids?)  -CTA head and neck negative for CVA/hemorrhage  - Neurology consult appreciated  - TSH, , Folate WNL  - B12 low normal : will add b12   -MR brain, EEG also neg.  - psyc recommending inpatient transfer given delusional paranoia
-Patient with new-onset auditory hallucinations and paranoia after discharge from [facility] for COVID.  -Will request medical records from hospitalization from [facility].  -Appreciate psych recommendations, plan to evaluate for etiology of psychosis (possibly secondary to COVID? vs. steroids?)  -CTA head and neck negative for CVA/hemorrhage  - Neurology consult appreciated  - TSH, , Folate WNL  - B12 low normal : will add b12   -MR brain, EEG also neg.  - psych recommending inpatient transfer given delusional paranoia
-Patient with new-onset auditory hallucinations and paranoia after discharge from [facility] for COVID.  -Will request medical records from hospitalization from [facility].  -Appreciate psych recommendations, plan to evaluate for etiology of psychosis (possibly secondary to COVID? vs. steroids?)  -CTA head and neck negative for CVA/hemorrhage  - Neurology consult appreciated  - TSH, B12, Folate WNL  -MR brain, EEG also neg.  - psyc recommending inpatient transfer given delusional paranoia

## 2021-08-24 NOTE — BH INPATIENT PSYCHIATRY ASSESSMENT NOTE - NSBHASSESSSUMMFT_PSY_ALL_CORE
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

## 2021-08-24 NOTE — BH CONSULTATION LIAISON PROGRESS NOTE - CASE SUMMARY
Pt is a 52 y/o Mandarin speaking, domiciled, single employed w/p recent hospitalization of COVID infection (?mid July) who was BIBEMS for bizarre behavior after landlord noticed he has been making strange noises at odd hours. Of note patient does not have any known history of psychiatric diagnoses. Per patient he has been "cleaning at night since August 11" due to which he has not slept much. Notes he hears noises like "Wooh and ahhhh" and is convinced "witchcraft is happening." Patient states his head has been clouded since his COVID infection, hospitalization and subsequent d/c.  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  Denies SI/HI.  8/24: pt oddly-related today, compiling lists of information, impulsive and repeatedly cleaning his space per staff, not yet back to baseline although occasionally able to reality test delusions since starting Zyprexa, otherwise appears to minimize symptoms and events leading to admission.  Recs: Increase Zyprexa as above, pending 2PC transfer when bed found, agree with fellow's assessment and plan as above.
Pt is a 52 y/o Mandarin speaking, domiciled, single employed w/p recent hospitalization of COVID infection (?mid July) who was BIBEMS for bizarre behavior after landlord noticed he has been making strange noises at odd hours. Of note patient does not have any known history of psychiatric diagnoses. Per patient he has been "cleaning at night since August 11" due to which he has not slept much. Notes he hears noises like "Wooh and ahhhh" and is convinced "witchcraft is happening." Patient states his head has been clouded since his COVID infection, hospitalization and subsequent d/c. Patient also mentioned that he was convinced he was being watched and a camera had been placed underneath his bed to spy on him. He continues to have the same delusions since admission to the ED and would benefit from antipsychotic medications. Discussed starting Zyprexa with patient and prescribed the first dose and a standing dose at 6pm and as a prn. 
Pt is a 52 y/o Mandarin speaking, domiciled, single employed w/p recent hospitalization of COVID infection (?mid July) who was BIBEMS for bizarre behavior after landlord noticed he has been making strange noises at odd hours. Of note patient does not have any known history of psychiatric diagnoses. Per patient he has been "cleaning at night since August 11" due to which he has not slept much. Notes he hears noises like "Wooh and ahhhh" and is convinced "witchcraft is happening." Patient states his head has been clouded since his COVID infection, hospitalization and subsequent d/c.  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  Denies SI/HI.  8/23: pt remains paranoid, minimizing recent statements, denies SI/HI, no acute behavioral events ON, aware of plan for psych transfer.  Recs: C/w Zyprexa as above, pending 2PC transfer when bed found, agree with fellow's assessment and plan as above.  
Pt is a 50 y/o Mandarin speaking, domiciled, single employed w/p recent hospitalization of COVID infection (?mid July) who was BIBEMS for bizarre behavior after landlord noticed he has been making strange noises at odd hours. Of note patient does not have any known history of psychiatric diagnoses. Per patient he has been "cleaning at night since August 11" due to which he has not slept much. Notes he hears noises like "Wooh and ahhhh" and is convinced "witchcraft is happening." Patient states his head has been clouded since his COVID infection, hospitalization and subsequent d/c.  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  Denies SI/HI.  Recs: Increase Zyprexa as above, check MRI, EEG, ?LP f/u neuro recs, [facility] psych will continue to follow.  Agree with fellow's assessment and plan as above.

## 2021-08-24 NOTE — BH INPATIENT PSYCHIATRY ASSESSMENT NOTE - NSBHCHARTREVIEWVS_PSY_A_CORE FT
Vital Signs Last 24 Hrs  T(C): 36.8 (24 Aug 2021 16:40), Max: 37.1 (23 Aug 2021 20:34)  T(F): 98.3 (24 Aug 2021 16:40), Max: 98.8 (23 Aug 2021 20:34)  HR: 97 (24 Aug 2021 16:40) (72 - 97)  BP: 111/73 (24 Aug 2021 16:40) (100/67 - 111/73)  BP(mean): --  RR: 18 (24 Aug 2021 16:40) (18 - 18)  SpO2: 96% (24 Aug 2021 16:40) (96% - 98%)

## 2021-08-24 NOTE — BH CONSULTATION LIAISON PROGRESS NOTE - NSBHATTESTSEENBY_PSY_A_CORE
attending Psychiatrist without NP/Trainee
attending Psychiatrist without NP/Trainee
Attending Psychiatrist supervising NP/Trainee, meeting pt...
attending Psychiatrist without NP/Trainee
Attending Psychiatrist supervising NP/Trainee, meeting pt...

## 2021-08-24 NOTE — BH CONSULTATION LIAISON PROGRESS NOTE - NSICDXBHPRIMARYDX_PSY_ALL_CORE
Psychosis, unspecified psychosis type   F29  

## 2021-08-25 LAB
ALBUMIN SERPL ELPH-MCNC: 4.1 G/DL — SIGNIFICANT CHANGE UP (ref 3.3–5)
ALP SERPL-CCNC: 57 U/L — SIGNIFICANT CHANGE UP (ref 40–120)
ALT FLD-CCNC: 16 U/L — SIGNIFICANT CHANGE UP (ref 4–41)
ANION GAP SERPL CALC-SCNC: 13 MMOL/L — SIGNIFICANT CHANGE UP (ref 7–14)
AST SERPL-CCNC: 21 U/L — SIGNIFICANT CHANGE UP (ref 4–40)
BILIRUB SERPL-MCNC: 0.6 MG/DL — SIGNIFICANT CHANGE UP (ref 0.2–1.2)
BUN SERPL-MCNC: 14 MG/DL — SIGNIFICANT CHANGE UP (ref 7–23)
CALCIUM SERPL-MCNC: 9.8 MG/DL — SIGNIFICANT CHANGE UP (ref 8.4–10.5)
CHLORIDE SERPL-SCNC: 105 MMOL/L — SIGNIFICANT CHANGE UP (ref 98–107)
CHOLEST SERPL-MCNC: 244 MG/DL — HIGH
CO2 SERPL-SCNC: 23 MMOL/L — SIGNIFICANT CHANGE UP (ref 22–31)
COVID-19 SPIKE DOMAIN AB INTERP: POSITIVE
COVID-19 SPIKE DOMAIN ANTIBODY RESULT: >250 U/ML — HIGH
CREAT SERPL-MCNC: 0.79 MG/DL — SIGNIFICANT CHANGE UP (ref 0.5–1.3)
GLUCOSE SERPL-MCNC: 92 MG/DL — SIGNIFICANT CHANGE UP (ref 70–99)
HCT VFR BLD CALC: 41.2 % — SIGNIFICANT CHANGE UP (ref 39–50)
HDLC SERPL-MCNC: 55 MG/DL — SIGNIFICANT CHANGE UP
HGB BLD-MCNC: 12.7 G/DL — LOW (ref 13–17)
LIPID PNL WITH DIRECT LDL SERPL: 156 MG/DL — HIGH
MCHC RBC-ENTMCNC: 30.2 PG — SIGNIFICANT CHANGE UP (ref 27–34)
MCHC RBC-ENTMCNC: 30.8 GM/DL — LOW (ref 32–36)
MCV RBC AUTO: 97.9 FL — SIGNIFICANT CHANGE UP (ref 80–100)
NON HDL CHOLESTEROL: 189 MG/DL — HIGH
NRBC # BLD: 0 /100 WBCS — SIGNIFICANT CHANGE UP
NRBC # FLD: 0 K/UL — SIGNIFICANT CHANGE UP
PLATELET # BLD AUTO: 294 K/UL — SIGNIFICANT CHANGE UP (ref 150–400)
POTASSIUM SERPL-MCNC: 4.2 MMOL/L — SIGNIFICANT CHANGE UP (ref 3.5–5.3)
POTASSIUM SERPL-SCNC: 4.2 MMOL/L — SIGNIFICANT CHANGE UP (ref 3.5–5.3)
PROT SERPL-MCNC: 6.4 G/DL — SIGNIFICANT CHANGE UP (ref 6–8.3)
RBC # BLD: 4.21 M/UL — SIGNIFICANT CHANGE UP (ref 4.2–5.8)
RBC # FLD: 14.6 % — HIGH (ref 10.3–14.5)
SARS-COV-2 IGG+IGM SERPL QL IA: >250 U/ML — HIGH
SARS-COV-2 IGG+IGM SERPL QL IA: POSITIVE
SODIUM SERPL-SCNC: 141 MMOL/L — SIGNIFICANT CHANGE UP (ref 135–145)
TRIGL SERPL-MCNC: 165 MG/DL — HIGH
WBC # BLD: 7.89 K/UL — SIGNIFICANT CHANGE UP (ref 3.8–10.5)
WBC # FLD AUTO: 7.89 K/UL — SIGNIFICANT CHANGE UP (ref 3.8–10.5)

## 2021-08-25 PROCEDURE — 99223 1ST HOSP IP/OBS HIGH 75: CPT

## 2021-08-25 RX ADMIN — OLANZAPINE 5 MILLIGRAM(S): 15 TABLET, FILM COATED ORAL at 08:38

## 2021-08-25 NOTE — BH INPATIENT PSYCHIATRY DISCHARGE NOTE - OTHER PAST PSYCHIATRIC HISTORY (INCLUDE DETAILS REGARDING ONSET, COURSE OF ILLNESS, INPATIENT/OUTPATIENT TREATMENT)
Patient is a 51 year old Chinese male, primarily Mandarin-speaking but does speak English, domiciled in a private apartment with roommate, employed as an , who denies a history of psychiatric treatment and diagnosis. Patient has a recent hospitalization with COVID infection in July at [facility]. Patient was brought in by EMS to [facility] for activated by his landlord for bizarre behaviors and making loud noises in his room at night. On the unit patient is appropriate and prefers to speak in English without  phone. Patient reports that after he returned home from Health system for covid-19, he felt his brain was "not working right." He reports that he was on oxygen for a few days, but noticed he was not tired at night time and felt like he had energy and stayed up for two days rearranging his room. He reports his landlord would check in on him often due to hearing these noises, and kept asking him to go to the hospital as the landlord felt he was not acting right. Patient states he was believing odd things such as there are people after him and being paranoid about the media, but since coming to hospital and getting sleep he has not been having these feelings. Patient with no known substance or legal issues. He reports being the only family in [place], with the rest of his family still in [place], but having friends in the area he keeps in touch with.

## 2021-08-25 NOTE — CONSULT NOTE ADULT - ASSESSMENT
51M recovered from hospitalization for COVID-19 July-August 2021, with new onset psychosis    Plan:  COVID-19: Recovered, does not require extended VTE ppx    Psychosis: Management per primary team

## 2021-08-25 NOTE — BH INPATIENT PSYCHIATRY DISCHARGE NOTE - HOSPITAL COURSE
Pt is a 52 y/o Mandarin speaking, domiciled, single, employed, no prior psych history, PMH recent hospitalization of COVID infection (admitted medically at [facility] 7/24-8/4) who was BIB EMS to [facility] for bizarre behavior after landlord noticed he has been making strange noises at odd hours. There were concerns for post-COVID psychosis at [facility] and pt underwent neurology & ID consult, w/u (TSH, Folate, B12 WNL; MR brain, EEG also neg). He was treated w/ olanzapine 5 mg, improved, but remained disorganized, thus he was transferred to [facility] for further psychiatric care 8/24-8/27/21.    On presentation to [facility], pt was linear, goal-directed and was able to report a coherent recent history of his illness with COVID and having poor sleep, increased goal-directed activity, AVH, delusions (witchcraft, cameras watching him) after discharge from [facility]. He however reported that he was no longer experiencing these symptoms and reported that he felt that he was back to baseline in mentation, sleep. Pt's return to baseline was also corroborated with a close friend who reported a consistent history and also reported that pt no longer seemed confused or having odd thinking. We felt his lack of psychiatric history and late-onset, short-lived psychosis post-COVID infection were consistent with psychosis d/t another medical condition.    Pt was continued on Zyprexa 5mg PO qHS which he tolerated well. He felt it helped him sleep.    Pt continued to be organized and denied psychotic symptoms consistently. He slept well and felt he was back to baseline sleeping patterns. He was calm and in good behavioral control. He was attentive to ADLs and IADLs. He was selectively social with peers.    We recommend that pt continue Zyprexa 5mg PO qHS for now and that he follow up with outpatient psychiatrist. He should be considered for discontinuing Zyprexa as psychosis should be continue to be resolved without medication if it was truly caused by COVID sequelae. Pt is a 50 y/o Mandarin speaking, domiciled, single, employed, no prior psych history, PMH recent hospitalization of COVID infection (admitted medically at [facility] 7/24-8/4) who was BIB EMS to [facility] for bizarre behavior after landlord noticed he has been making strange noises at odd hours. There were concerns for post-COVID psychosis at [facility] and pt underwent neurology & ID consult, w/u (TSH, Folate, B12 WNL; MR brain, EEG also neg). He was treated w/ olanzapine 5 mg, improved, but remained disorganized, thus he was transferred to [facility] for further psychiatric care 8/24-8/27/21.    On presentation to [facility], pt was linear, goal-directed and was able to report a coherent recent history of his illness with COVID and having poor sleep, increased goal-directed activity, AVH, delusions (witchcraft, cameras watching him) after discharge from [facility]. He however reported that he was no longer experiencing these symptoms and reported that he felt that he was back to baseline in mentation, sleep. Pt's return to baseline was also corroborated with a close friend who reported a consistent history and also reported that pt no longer seemed confused or to have odd thinking. We felt his lack of psychiatric history and late-onset, short-lived psychosis post-COVID infection were consistent with psychosis d/t another medical condition.    Pt was continued on Zyprexa 5mg PO qHS which he tolerated well. He felt it helped him sleep.    Pt continued to be organized and denied psychotic symptoms consistently. He slept well and similarly to baseline sleeping patterns. He was calm and in good behavioral control. He was attentive to ADLs and IADLs. He was selectively social with peers.    On day of discharge, pt has fair hygiene and grooming. He is calm, cooperative with the interview and maintained appropriate eye contact.  No psychomotor agitation or retardation noted.  Steady gait observed.  The patient's speech was fluent, normal in tone, rate, and volume.  The patient's mood is fine."  Affect is euthymic/neutral, mildly constricted, stable and appropriate.  Thoughts process is linear, goal directed, tight. Thought content is without delusions, paranoia, S/H IIP.  He denies AVH and does not appear to have perceptual disturbances. Insight is fair.  Judgment is fair.  Impulse control is intact.    We recommend that pt continue Zyprexa 5mg PO qHS for now and that he follow up with outpatient psychiatrist. He should be considered for discontinuing Zyprexa as psychosis should be continue to be resolved without medication if it was truly caused by COVID sequelae.

## 2021-08-25 NOTE — BH INPATIENT PSYCHIATRY PROGRESS NOTE - NSBHFUPINTERVALHXFT_PSY_A_CORE
Pt was admitted overnight. No acute events overnight, no PRNs utilized. Pt slept fairly from 12am-5am per routine checks. Pt adherent with medications.  Writer meeting pt for first time. Pt reports he was at [facility] in July d/t COVID and was discharged on 8/4 back home on O2 supplementation. He reports starting to feel confused, slowed down, unstable in his walking starting 8/9 or 8/10. He reports becoming "excited" and having decreased sleep, increased goal-directed activity (being on his computer, cleaning) which made a lot of noise in the apartment that concerned his landlord. He reports that landlord advised him to go to the hospital because "something was wrong with his brain" and called EMS on 8/16 which led to [facility] admission. He reports sleep improved since being at [facility] and that he feels ready to go home now, does not feel that further admission is needed. He denies feeling paranoid. 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 He reports living alone in the same apartment since 2015, family is in [place]. He has a few friends whom he spoke to on the phone today, is amenable with writers calling his friends. He asks to shave. Pt was admitted overnight. No acute events overnight, no PRNs utilized. Pt slept fairly from 12am-5am per routine checks. Pt adherent with medications.  Writer meeting pt for first time. Pt reports he was at [facility] in July d/t COVID and was discharged on  back home on O2 supplementation. He reports starting to feel confused, slowed down, unstable in his walking starting  or 8/10. He reports becoming "excited" and having decreased sleep, increased goal-directed activity (being on his computer, cleaning) which made a lot of noise in the apartment that concerned his landlord. He reports that landlord advised him to go to the hospital because "something was wrong with his brain" and called EMS on  which led to [facility] admission. He reports sleep improved since being at [facility] and that he feels ready to go home now, does not feel that further admission is needed. He denies feeling paranoid. 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 He reports living alone in the same apartment since , family is in [place]. He has a few friends whom he spoke to on the phone today, is amenable with writers calling his friends. He asks to shave.    Later pt is seen watching TV and speaking with a peer. He is amenable to another interview. He reports having VH of seeing patches of yellow and seeing ants crawling on the floor when he was at [facility] and that he subsequently had confusion, hallucinations, delusions after discharge from [facility] but these have all resolved. He denies AVH, paranoia currently. He denies ever having hallucinations/delusions in the past, denies ever taking any psychotropic medications, denies family hx of psychiatric illnesses or suicide attempts. He reports immigrating from [place] since 20 years ago for greater freedom in the . He reports having children of his own or being  but has taken care of a  friend's son as his own. He stays in touch with his family through text messages. He reports having a friend Briana whom he allowed writer to call and provides her number. He reports that Zyprexa seems to help with his sleep.    Spoke with pt's friend Briana [phone] who corroborates that pt was hospitalized for COVID in July, then after discharge pt appeared to have difficulty breathing or getting sufficient oxygen and landlord called EMS. He seemed normal after discharge from [facility] initially and had been staying in touch with her daily, but after the first 2 days back home, he wasn't calling her or picking up the phone likely d/t being confused. She reports he seemed to have hallucinations (could see "stories playing out" that made no sense to him), was hoarding things in his room, and reported that he felt he was "dreaming." However she feels that pt now sounds "normal" on the phone and spoke with him yesterday. She reports knowing him for the past 15 years and that he has never hallucinated or had odd thinking, confusion before. Denies any knowledge of pt using drugs, smoking, drinking. She describes him as a very hard worker, gentle person. Many friends all worried about him. Pt was admitted overnight. No acute events overnight, no PRNs utilized. Pt slept fairly from 12am-5am per routine checks. Pt adherent with medications.  Writer meeting pt for first time. Pt reports he was at [facility] in July d/t COVID and was discharged on  back home on O2 supplementation. He reports starting to feel confused, slowed down, unstable in his walking starting  or 8/10. He reports becoming "excited" and having decreased sleep, increased goal-directed activity (being on his computer, cleaning) which made a lot of noise in the apartment that concerned his landlord. He reports that landlord advised him to go to the hospital because "something was wrong with his brain" and called EMS on  which led to [facility] admission. He reports sleep improved since being at [facility] and that he feels ready to go home now, does not feel that further admission is needed. He denies feeling paranoid. 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 He reports living alone in the same apartment since , family is in [place]. He has a few friends whom he spoke to on the phone today, is amenable with writers calling his friends. He asks to shave.    Later pt is seen watching TV and speaking with a peer. He is amenable to another interview. He reports having VH of seeing patches of yellow and seeing ants crawling on the floor when he was at [facility] and that he subsequently had confusion, hallucinations, delusions after discharge from [facility] but these have all resolved. He denies AVH, paranoia currently. He denies ever having hallucinations/delusions in the past, denies ever taking any psychotropic medications, denies family hx of psychiatric illnesses or suicide attempts. He reports immigrating from [place] since 20 years ago for greater freedom in the . He reports having children of his own or being  but has taken care of a  friend's son as his own. He stays in touch with his family through text messages. He reports having a friend Briana whom he allowed writer to call and provides her number. He reports that Zyprexa seems to help with his sleep.    Spoke with pt's friend Briana [phone] who corroborates that pt was hospitalized for COVID in July, then after discharge pt appeared to have difficulty breathing or getting sufficient oxygen and landlord called EMS. He seemed normal after discharge from [facility] initially and had been staying in touch with her daily, but after the first 2 days back home, he wasn't calling her or picking up the phone likely d/t being confused. She reports he seemed to have hallucinations (could see "stories playing out" that made no sense to him), was hoarding things in his room, and reported that he felt he was "dreaming." However she feels that pt now sounds "normal" on the phone and spoke with him yesterday. She reports knowing him for the past 15 years and that he has never hallucinated or had odd thinking, confusion before. Denies any knowledge of pt using drugs, smoking, drinking. She describes him as a very hard worker, gentle person. Many friends all worried about him.    Per staff roughly five hours of sleep last night.

## 2021-08-25 NOTE — BH TREATMENT PLAN - NSTXDISORGINTERRN_PSY_ALL_CORE
Assess for mood and behavior.  Encourage patient to verbalize feelings/concerns to staff.   Provide support and safe environment.  Administer medication as prescribed and monitor for effects and side effects.  Provide reassurance of safety and reality orientation/redirection as needed.

## 2021-08-25 NOTE — CONSULT NOTE ADULT - SUBJECTIVE AND OBJECTIVE BOX
HPI: Pt is 51M who was hospitalized for COVID-19 at [facility] July-August requiring hi-flow O2, received remdesivir, dexamethasone and tocilizumab.  He recovered and was sent home with o2 which he no longer needs.  He developed psychosis and was admitted to [facility] 8/16/21.  Neurology evaluated patient, MRI and EEG were normal and he was transferred to [facility] for management of psychosis.  He denies complaints at this time.    PAST MEDICAL & SURGICAL HISTORY:  2019 novel coronavirus disease (COVID-19)    No significant past surgical history        Review of Systems:   CONSTITUTIONAL: No fever, weight loss, or fatigue  EYES: No eye pain, visual disturbances, or discharge  ENMT:  No difficulty hearing, tinnitus, vertigo; No sinus or throat pain  NECK: No pain or stiffness  RESPIRATORY: No cough, wheezing, chills or hemoptysis; No shortness of breath  CARDIOVASCULAR: No chest pain, palpitations, dizziness, or leg swelling  GASTROINTESTINAL: No abdominal or epigastric pain. No nausea, vomiting, or hematemesis; No diarrhea or constipation. No melena or hematochezia.  GENITOURINARY: No dysuria, frequency, hematuria, or incontinence  NEUROLOGICAL: No headaches, memory loss, loss of strength, numbness, or tremors  SKIN: No itching, burning, rashes, or lesions   LYMPH NODES: No enlarged glands  ENDOCRINE: No heat or cold intolerance; No hair loss  MUSCULOSKELETAL: No joint pain or swelling; No muscle, back, or extremity pain  HEME/LYMPH: No easy bruising, or bleeding gums  ALLERY AND IMMUNOLOGIC: No hives or eczema    Allergies    No Known Allergies    Intolerances        Social History: denies etoh tob idu    FAMILY HISTORY:  No pertinent family history in first degree relatives        MEDICATIONS  (STANDING):  OLANZapine 5 milliGRAM(s) Oral daily    MEDICATIONS  (PRN):  LORazepam     Tablet 2 milliGRAM(s) Oral once PRN Severe Agitation      Vital Signs Last 24 Hrs  T(C): 36.9 (25 Aug 2021 06:42), Max: 36.9 (25 Aug 2021 06:42)  T(F): 98.5 (25 Aug 2021 06:42), Max: 98.5 (25 Aug 2021 06:42)  HR: 97 (24 Aug 2021 16:40) (97 - 97)  BP: 111/73 (24 Aug 2021 16:40) (111/73 - 111/73)  BP(mean): --  RR: 18 (24 Aug 2021 16:40) (18 - 18)  SpO2: 96% (24 Aug 2021 16:40) (96% - 96%)  CAPILLARY BLOOD GLUCOSE            PHYSICAL EXAM:  GENERAL: NAD, well-developed  HEAD:  Atraumatic, Normocephalic  EYES: EOMI, conjunctiva and sclera clear  NECK: Supple, No JVD  CHEST/LUNG: Clear to auscultation bilaterally; No wheeze  HEART: Regular rate and rhythm; No murmurs, rubs, or gallops  ABDOMEN: Soft, Nontender, Nondistended; Bowel sounds present  EXTREMITIES:  2+ Peripheral Pulses, No clubbing, cyanosis, or edema  NEUROLOGY: non-focal  SKIN: No rashes or lesions    LABS:                        12.7   7.89  )-----------( 294      ( 25 Aug 2021 10:30 )             41.2     08-25    141  |  105  |  14  ----------------------------<  92  4.2   |  23  |  0.79    Ca    9.8      25 Aug 2021 10:30    TPro  6.4  /  Alb  4.1  /  TBili  0.6  /  DBili  x   /  AST  21  /  ALT  16  /  AlkPhos  57  08-25

## 2021-08-25 NOTE — BH INPATIENT PSYCHIATRY DISCHARGE NOTE - HPI (INCLUDE ILLNESS QUALITY, SEVERITY, DURATION, TIMING, CONTEXT, MODIFYING FACTORS, ASSOCIATED SIGNS AND SYMPTOMS)
52 y/o Mandarin speaking, domiciled, single employed w/p recent hospitalization of COVID infection (?mid July) who was BIBEMS to [facility] for tom kevin after landlord noticed he has been making strange noises at odd hours. Of note patient does not have history of psychiatric diagnoses. Per patient he has been "cleaning at night since August 11" due to which he has not slept well. Notes he heard noises like "Wooh and ahhhh" and is convinced "witchcraft is happening." Patient states his head has been clouded since his COVID infection, hospitalization and subsequent d/c. Patient also mentioned to the ED team that he was convinced he was being watched and a camera had been placed underneath his bed to spy on him. Patient reported he has been cleaning endlessly trying to locate an pungent odor and states there is "too much garbage around," and insinuates someone sabotaged him and left this garbage in his apartment while he was hospitalized. Patient stated he does not speak to his roommate and does not feel this has been the work of his roommate. He was aware of the noise complaints made about him with the landlord but states he must clean, or he "falls everywhere,' as he shows multiple small punctures and abrasions on his legs bilaterally. Patient was admitted   CTA head and neck negative for CVA/hemorrhage  - Neurology consult done  - TSH, , Folate WNL  - B12 low normal : will add b12   -MR brain, EEG also neg.  Patient was treated with olanzapine 5 mg, improved, but remained disorganized, transferred on 2 PC for continued psychiatric care.

## 2021-08-25 NOTE — BH SOCIAL WORK INITIAL PSYCHOSOCIAL EVALUATION - OTHER PAST PSYCHIATRIC HISTORY (INCLUDE DETAILS REGARDING ONSET, COURSE OF ILLNESS, INPATIENT/OUTPATIENT TREATMENT)
Patient is a 51 year old [place] male, primarily Mandarin-speaking but does speak English, domiciled in a private apartment with roommate, employed as an , who denies a history of psychiatric treatment and diagnosis. Patient has a recent hospitalization with COVID infection in July at [facility]. Patient was brought in by EMS to [facility] for activated by his landlord for bizarre behaviors and making loud noises in his room at night. On the unit patient is appropriate and prefers to speak in English without  phone. Patient reports that after he returned home from [facility] for covid-19, he felt his brain was "not working right." He reports that he was on oxygen for a few days, but noticed he was not tired at night time and felt like he had energy and stayed up for two days rearranging his room. He reports his landlord would check in on him often due to hearing these noises, and kept asking him to go to the hospital as the landlord felt he was not acting right. Patient states he was believing odd things such as there are people after him and being paranoid about the media, but since coming to hospital and getting sleep he has not been having these feelings. Patient with no known substance or legal issues. He reports being the only family in [place], with the rest of his family still in [place], but having friends in the area he keeps in touch with.

## 2021-08-25 NOTE — BH INPATIENT PSYCHIATRY DISCHARGE NOTE - NSDCMRMEDTOKEN_GEN_ALL_CORE_FT
OLANZapine 10 mg intramuscular injection: 2.5 milligram(s) intramuscular every 6 hours, As needed, Severe Agitation  OLANZapine 2.5 mg oral tablet: 1 tab(s) orally every 6 hours, As needed, Agitation or paranoia  OLANZapine 5 mg oral tablet: 1 tab(s) orally  daily at 1800   OLANZapine 5 mg oral tablet: 1 tab(s) orally once a day (at bedtime)  daily at 1800

## 2021-08-25 NOTE — PSYCHIATRIC REHAB INITIAL EVALUATION - NSBHPRRECOMMEND_PSY_ALL_CORE
Writer attempted to meet with patient to orient to unit and introduce self, psychiatric rehabilitation staff and department functions, however pt sleeping and unable to be roused. Per chart, pt is domiciled and employed. Pt was BIBEMS for demonstrating bizarre behavior. Pt not sleeping well. Pt seems to have paranoid symptomology as he believes there is a “camera placed under his bed to spy on him.” Pt feels that he is being watched by others. Writer will encourage patient to attend psychiatric rehabilitation groups and engage in treatment. Writer and patient were unable able to establish a collaborative psychiatric rehabilitation goal, therefore one will be selected with thought and intention.  Psychiatric Rehabilitation staff will continue to engage patient daily in order to develop therapeutic rapport.

## 2021-08-25 NOTE — BH INPATIENT PSYCHIATRY DISCHARGE NOTE - NSBHDCRISKMITIGATEFT_PSY_ALL_CORE
Pt was in acute risk of harm d/t psychosis, insomnia in context of post-COVID illness. This was mitigated through hospitalization.  Chronic risk factors include male gender, single status.  Protective factors include no prior psychiatric diagnoses or admissions, no prior SA/SIB, no hx of aggression, employed, stable housing, no access to firearms, denying S/H IIP, having support (friends), being future-oriented.  Pt is not at significant chronic risk for harm. His acute risk for harm was mitigated and stabilized through hospitalization. Pt had numerous protective factors. Pt is amenable to safety planning and agrees to call 911, go to the nearest ED, or reach out to outpt providers if in imminent risk of harm. He is safe for discharge.

## 2021-08-25 NOTE — BH INPATIENT PSYCHIATRY DISCHARGE NOTE - NSBHMETABOLIC_PSY_ALL_CORE_FT
BMI: BMI (kg/m2): 27.5 (08-24-21 @ 19:03)  HbA1c:   Glucose:   BP: --  Lipid Panel: Date/Time: 08-25-21 @ 10:30  Cholesterol, Serum: 244  Direct LDL: --  HDL Cholesterol, Serum: 55  Total Cholesterol/HDL Ration Measurement: --  Triglycerides, Serum: 165

## 2021-08-25 NOTE — BH INPATIENT PSYCHIATRY PROGRESS NOTE - NSBHASSESSSUMMFT_PSY_ALL_CORE
52 y/o Mandarin speaking, domiciled, single, employed, denies PPHx, PMH recent hospitalization of COVID infection (admitted medically at [facility] 7/24-8/4) who was BIB EMS to [facility] for bizzarre behavior after landlord noticed he has been making strange noises at odd hours, poor sleep, increased goal-directed activity, AVH, delusions (witchcraft, cameras watching him). There were concerns for post-COVID psychosis at [facility] and pt underwent neurology & ID consult, w/u (TSH, Folate, B12 WNL; MR brain, EEG also neg). He was treated w/ olanzapine 5 mg, improved, but remained disorganized, transferred on 2 PC for continued psychiatric care.    Pt is organized, denying psychotic symptoms, appears well though suspect a degree of minimizing d/t wanting discharge. Will need further collateral to ascertain no prior psych history. Also warrants further observation for ongoing stable sleep, abatement of psychotic symptoms. Pt requires hospitalization for safety.    - C/w Zyprexa 5mg PO qHS  - Will contact pt's friends for collateral 50 y/o Mandarin speaking, domiciled, single, employed, denies PPHx, PMH recent hospitalization of COVID infection (admitted medically at [facility] 7/24-8/4) who was BIB EMS to [facility] for bizzarre behavior after landlord noticed he has been making strange noises at odd hours, poor sleep, increased goal-directed activity, AVH, delusions (witchcraft, cameras watching him). There were concerns for post-COVID psychosis at [facility] and pt underwent neurology & ID consult, w/u (TSH, Folate, B12 WNL; MR brain, EEG also neg). He was treated w/ olanzapine 5 mg, improved, but remained disorganized, transferred on 2 PC for continued psychiatric care.    Pt is organized, denying psychotic symptoms, appears well. This is consistent with collateral report. Pt however warrants further observation for ongoing stable sleep and attenuation of psychotic symptoms. Pt requires hospitalization for safety.    - C/w Zyprexa 5mg PO qHS

## 2021-08-25 NOTE — BH INPATIENT PSYCHIATRY DISCHARGE NOTE - NSDCCPCAREPLAN_GEN_ALL_CORE_FT
PRINCIPAL DISCHARGE DIAGNOSIS  Diagnosis: Psychosis  Assessment and Plan of Treatment: Possibly related to COVID. Please follow up with your outpatient doctor.

## 2021-08-25 NOTE — BH INPATIENT PSYCHIATRY PROGRESS NOTE - NSBHCHARTREVIEWVS_PSY_A_CORE FT
Orthostatic VS    08-25-21 @ 06:42  Lying BP: --/-- HR: --  Sitting BP: 100/65 HR: 99  Standing BP: 95/62 HR: 120  Site: --  Mode: --    08-24-21 @ 19:03  Lying BP: --/-- HR: --   Sitting BP: 109/76 HR: 107  Standing BP: 98/81 HR: 115  Site: --   Mode: --   Vital Signs Last 24 Hrs  T(C): 36.9 (08-25-21 @ 06:42), Max: 36.9 (08-25-21 @ 06:42)  T(F): 98.5 (08-25-21 @ 06:42), Max: 98.5 (08-25-21 @ 06:42)  HR: 97 (08-24-21 @ 16:40) (97 - 97)  BP: 111/73 (08-24-21 @ 16:40) (111/73 - 111/73)  BP(mean): --  RR: 18 (08-24-21 @ 16:40) (18 - 18)  SpO2: 96% (08-24-21 @ 16:40) (96% - 96%)    Orthostatic VS  08-25-21 @ 06:42  Lying BP: --/-- HR: --  Sitting BP: 100/65 HR: 99  Standing BP: 95/62 HR: 120  Site: --  Mode: --  Orthostatic VS  08-24-21 @ 19:03  Lying BP: --/-- HR: --  Sitting BP: 109/76 HR: 107  Standing BP: 98/81 HR: 115  Site: --  Mode: --

## 2021-08-25 NOTE — BH SOCIAL WORK INITIAL PSYCHOSOCIAL EVALUATION - NSCMSPTSTRENGTHS_PSY_ALL_CORE
Able to adapt/Compliance to treatment/Expressive of emotions/Financial stability/Future/goal oriented/Intact employment/Intelligence/Interpersonal skills/Leisure interest/Self-reliant/Tolerant

## 2021-08-25 NOTE — BH INPATIENT PSYCHIATRY PROGRESS NOTE - NSBHMETABOLIC_PSY_ALL_CORE_FT
Lipid Profile in AM (08.25.21 @ 10:30)   Cholesterol, Serum: 244 mg/dL   Triglycerides, Serum: 165 mg/dL   HDL Cholesterol, Serum: 55 mg/dL      BMI: BMI (kg/m2): 27.5 (08-24-21 @ 19:03)  HbA1c:   Glucose:   BP: --  Lipid Panel: Date/Time: 08-25-21 @ 10:30  Cholesterol, Serum: 244  Direct LDL: --  HDL Cholesterol, Serum: 55  Total Cholesterol/HDL Ration Measurement: --  Triglycerides, Serum: 165

## 2021-08-25 NOTE — BH INPATIENT PSYCHIATRY DISCHARGE NOTE - NSDCPROCEDURESFT_PSY_ALL_CORE
EXAM:  MR BRAIN [facility] IC                        PROCEDURE DATE:  08/19/2021    INTERPRETATION:  CLINICAL INFORMATION: Recent Covid infection. Acute delusion and hallucinations.  COMPARISON: Comparison is made to the prior CT study of the head and CT angiogram studies of the head and neck dated 8/16/2021.  FINDINGS:  Mild prominence of the sulci and ventricles consistent with age-appropriate volume loss.  There is no intraparenchymal hemorrhage, mass effect or midline shift. No areas of abnormal enhancement.  No abnormal extra-axial fluid collections are present. There is no diffusion abnormality to suggest acute or subacute infarction. Major flow-voids at the base of the brain follow expected course and contour.  The calvarium is intact. The visualized intraorbital compartments and tympanomastoid cavities appear free of acute disease. Mild mucosal thickening of the left maxillary and ethmoid sinuses.  There is a 1.1 x 0.8 x 1.4 cm (AP by TV by CC) well-circumscribed, slightly T1 hyperintense lesion without enhancement in the left anterior nasal fold compatible with a nasolabial cyst.  IMPRESSION: No acute intracranial pathology.      EXAM:  CT ANGIO BRAIN (W)AW IC   EXAM:  CT ANGIO NECK (W)AW IC  PROCEDURE DATE:  08/16/2021    INTERPRETATION:  CLINICAL INDICATION: Altered mental status, recent Covid infection  No prior brain imaging is available for comparison.  The fourth, third and lateral ventricles are normal size and position. There is no hemorrhage, mass or shift of the midline structures. There is normal gray white matter differentiation. Bone window examination is unremarkable.  The origins of the carotid and vertebral arteries are normal. The left vertebral artery originates from the aortic arch as a separate vessel which is a normal variant. The vertebral arteries are codominant.  The distal vertebral arteries are well identified as are the posterior-inferior cerebellar arteries bilaterally. The region of the vertebral basilar junction is normal. The basilar artery is normal. The posterior cerebral and superior cerebellar arteries are normal.  Evaluation of the carotid arteries demonstrate normal appearance to the distal cervical, petrous cavernous and clinoid internal carotid arteries. The anterior cerebral arteries, anterior communicating artery and middle cerebral arteries are normal.  There is no evidence of aneurysm, stenosis, or vessel occlusion.  The normal intracranial venous circulation is identified. The right transverse sinus is dominant. The superior sagittal sinus, internal cerebral veins, vein of Harley, straight sinus, transverse sinuses, sigmoid sinuses and internal jugular veins are normal. Cortical veins are normal.  Incidental note is made of bilateral groundglass opacities in the apices of the lungs bilaterally which are likely related to Covid infection.  IMPRESSION: Normal brain CT. Normal CTA of the head and neck. Abnormal lung apices consistent with sequela of Covid infection.    Lipid Profile in AM (08.25.21 @ 10:30)   Cholesterol, Serum: 244 mg/dL   Triglycerides, Serum: 165 mg/dL   HDL Cholesterol, Serum: 55 mg/dL   Non HDL Cholesterol: 189    Comprehensive Metabolic Panel in AM (08.25.21 @ 10:30)   Sodium, Serum: 141 mmol/L   Potassium, Serum: 4.2 mmol/L   Chloride, Serum: 105 mmol/L   Carbon Dioxide, Serum: 23 mmol/L   Anion Gap, Serum: 13 mmol/L   Blood Urea Nitrogen, Serum: 14 mg/dL   Creatinine, Serum: 0.79 mg/dL   Glucose, Serum: 92 mg/dL   Calcium, Total Serum: 9.8 mg/dL   Protein Total, Serum: 6.4 g/dL   Albumin, Serum: 4.1 g/dL   Bilirubin Total, Serum: 0.6 mg/dL   Alkaline Phosphatase, Serum: 57 U/L   Aspartate Aminotransferase (AST/SGOT): 21 U/L   Alanine Aminotransferase (ALT/SGPT): 16 U/L     Complete Blood Count in AM (08.25.21 @ 10:30)   Nucleated RBC: 0 /100 WBCs   WBC Count: 7.89 K/uL   RBC Count: 4.21 M/uL   Hemoglobin: 12.7 g/dL   Hematocrit: 41.2 %   Mean Cell Volume: 97.9 fL   Mean Cell Hemoglobin: 30.2 pg   Mean Cell Hemoglobin Conc: 30.8 gm/dL   Red Cell Distrib Width: 14.6 %   Platelet Count - Automated: 294 K/uL   Nucleated RBC #: 0.00 K/uL     COVID-19 PCR . (08.24.21 @ 13:28)   COVID-19 PCR: Not Detected

## 2021-08-25 NOTE — BH INPATIENT PSYCHIATRY DISCHARGE NOTE - NSBHDCMEDICALFT_PSY_A_CORE
He was notable for having oxygen supplementation from discharge at [facility]. He was  He was notable for having oxygen supplementation from discharge at [facility]. He had good oxygenation on room air during hospitalization. Pt is aware to attend medical follow up that he was referred to by [facility].

## 2021-08-25 NOTE — BH TREATMENT PLAN - NSCMSPTSTRENGTHS_PSY_ALL_CORE
Able to adapt/Compliance to treatment/Expressive of emotions/Financial stability/Future/goal oriented/Highly motivated for treatment/Intact employment/Intelligence/Interpersonal skills/Leisure interest/Positive attitude/Resourceful/Self-reliant/Successful coping history/Tolerant

## 2021-08-26 VITALS — TEMPERATURE: 98 F

## 2021-08-26 PROCEDURE — 99239 HOSP IP/OBS DSCHRG MGMT >30: CPT

## 2021-08-26 RX ORDER — OLANZAPINE 15 MG/1
1 TABLET, FILM COATED ORAL
Qty: 14 | Refills: 0
Start: 2021-08-26 | End: 2021-09-08

## 2021-08-26 RX ADMIN — OLANZAPINE 5 MILLIGRAM(S): 15 TABLET, FILM COATED ORAL at 08:11

## 2021-08-26 NOTE — BH INPATIENT PSYCHIATRY PROGRESS NOTE - NSBHFUPINTERVALHXFT_PSY_A_CORE
No acute events overnight, no PRNs utilized. Pt slept fairly per routine checks. Pt adherent with medications. Pt reports he slept well from 9pm-5am though awoke once at around 2am to use the bathroom. He feels well rested, has good appetite. He reports mood is "Fine." He is finding the Zyprexa helpful for sleep. He denies AVH, paranoia, S/H IIP. He reports feeling ready to go home. We discussed need to follow up outpatient. Pt is amenable to safety planning and agrees to call 911, go to the nearest ED, or reach out to outpt providers if in imminent risk of harm. No acute events overnight, no PRNs utilized. Pt slept fairly per routine checks. Pt adherent with medications. Pt reports he slept well from 9pm-5am though awoke once at around 2am to use the bathroom. He feels well rested, has good appetite. He reports mood is "Fine." He is finding the Zyprexa helpful for sleep. He denies AVH, paranoia, S/H IIP. Staff report no odd behaviors.  He reports feeling ready to go home. We discussed need to follow up outpatient. Pt is amenable to safety planning and agrees to call 911, go to the nearest ED, or reach out to outpt providers if in imminent risk of harm.  His acute presenting symptoms are greatly attenuated and he is not an acute danger to himself or others and desires to leave thehospital and he is being discahrged in accordance with his wishes.

## 2021-08-26 NOTE — BH SAFETY PLAN - WARNING SIGN 1
I faced some situations that never happened before like forgetting things, exciting, don't want to sleep.

## 2021-08-26 NOTE — BH INPATIENT PSYCHIATRY PROGRESS NOTE - NSCGISEVERILLNESS_PSY_ALL_CORE
5 = Markedly ill - intrusive symptoms that distinctly impair social/occupational function or cause intrusive levels of distress
2 = Borderline mentally ill – subtle or suspected pathology

## 2021-08-26 NOTE — BH INPATIENT PSYCHIATRY PROGRESS NOTE - NSTXDISORGGOAL_PSY_ALL_CORE
Will identify 2 coping skills that assist in organizing
Will demonstrate related thoughts for 5 min in conversation

## 2021-08-26 NOTE — BH INPATIENT PSYCHIATRY PROGRESS NOTE - MODIFICATIONS
Patient seen, chart reviewed, case discussed with team.  I saw the patient with the resident, discussed case with resident and reviewed all sections of the note, made changes where appropriate and agree with it as written.  He is not an acute danger to himself or others and is apporpiate for discharge and outpatient follow up.
Patient seen, chart reviewed, case discussed with team.  I saw the patient with the resident, discussed case with resident and reviewed all sections of the note, made changes where appropriate and agree with it as written.

## 2021-08-26 NOTE — BH INPATIENT PSYCHIATRY PROGRESS NOTE - NSBHCHARTREVIEWVS_PSY_A_CORE FT
Vital Signs Last 24 Hrs  T(C): 36.7 (08-26-21 @ 06:07), Max: 36.7 (08-26-21 @ 06:07)  T(F): 98 (08-26-21 @ 06:07), Max: 98 (08-26-21 @ 06:07)  HR: --  BP: --  BP(mean): --  RR: --  SpO2: --    Orthostatic VS  08-26-21 @ 06:07  Lying BP: --/-- HR: --  Sitting BP: 105/67 HR: 83  Standing BP: 101/65 HR: 100  Site: --  Mode: --  Orthostatic VS  08-25-21 @ 06:42  Lying BP: --/-- HR: --  Sitting BP: 100/65 HR: 99  Standing BP: 95/62 HR: 120  Site: --  Mode: --  Orthostatic VS  08-24-21 @ 19:03  Lying BP: --/-- HR: --  Sitting BP: 109/76 HR: 107  Standing BP: 98/81 HR: 115  Site: --  Mode: --   Vital Signs Last 24 Hrs  T(C): 36.7 (08-26-21 @ 06:07), Max: 36.7 (08-26-21 @ 06:07)  T(F): 98 (08-26-21 @ 06:07), Max: 98 (08-26-21 @ 06:07)    Orthostatic VS  08-26-21 @ 06:07  Lying BP: --/-- HR: --  Sitting BP: 105/67 HR: 83  Standing BP: 101/65 HR: 100  Site: --  Mode: --  Orthostatic VS  08-25-21 @ 06:42  Lying BP: --/-- HR: --  Sitting BP: 100/65 HR: 99  Standing BP: 95/62 HR: 120  Site: --  Mode: --  Orthostatic VS  08-24-21 @ 19:03  Lying BP: --/-- HR: --  Sitting BP: 109/76 HR: 107  Standing BP: 98/81 HR: 115  Site: --  Mode: --

## 2021-08-26 NOTE — BH INPATIENT PSYCHIATRY PROGRESS NOTE - CURRENT MEDICATION
MEDICATIONS  (STANDING):  OLANZapine 5 milliGRAM(s) Oral daily    MEDICATIONS  (PRN):  LORazepam     Tablet 2 milliGRAM(s) Oral once PRN Severe Agitation  
MEDICATIONS  (STANDING):  OLANZapine 5 milliGRAM(s) Oral daily    MEDICATIONS  (PRN):  LORazepam     Tablet 2 milliGRAM(s) Oral once PRN Severe Agitation

## 2021-08-26 NOTE — BH DISCHARGE NOTE NURSING/SOCIAL WORK/PSYCH REHAB - NSDCPRGOAL_PSY_ALL_CORE
Upon admission, pt initially was BIB EMS to [facility] for bizarre behavior after landlord noticed he has been making strange noises at odd hours. After receiving treatment at [facility], pt remained disorganized and thus was transferred to [facility] for psychiatric care. Pt initially presented as linear, goal directed, and reported his recent course of COVID and having poor sleeping patterns, increased goal directed activity, AVH, and delusions. While on the unit, pt demonstrated daily medication and treatment compliance with good effect. On the unit pt continued to be organized and denied psychotic symptoms consistently. Pt slept well and demonstrated baseline patterns. Pt was calm and in good behavioral control. Pt was selectively social with peers and attended to ADLs independently. On day of DC, pt denied SI/HI and AH/VH/TH. Pt demonstrated good insight into his illness and symptoms, and was able to identify warning signs, healthy coping skills, social supports, and reasons for living. Pt successfully completed safety planning.

## 2021-08-26 NOTE — BH INPATIENT PSYCHIATRY PROGRESS NOTE - NSCGIIMPROVESX_PSY_ALL_CORE
4 = No change - symptoms remain essentially unchanged
2 = Much improved - notably better with signficant reduction of symptoms; increase in the level of functioning but some symptoms remain

## 2021-08-26 NOTE — BH DISCHARGE NOTE NURSING/SOCIAL WORK/PSYCH REHAB - PATIENT PORTAL LINK FT
You can access the FollowMyHealth Patient Portal offered by [facility] by registering at the following website: [email or web address]. By joining [employer]’s FollowMyHealth portal, you will also be able to view your health information using other applications (apps) compatible with our system.

## 2021-08-26 NOTE — BH DISCHARGE NOTE NURSING/SOCIAL WORK/PSYCH REHAB - DISCHARGE INSTRUCTIONS AFTERCARE APPOINTMENTS
In order to check the location, date, or time of your aftercare appointment, please refer to your Discharge Instructions Document given to you upon leaving the hospital.  If you have lost the instructions please call [phone]

## 2021-08-26 NOTE — BH SAFETY PLAN - STEP 6 SAFE ENVIRONMENT
It is on the top floor, and roommate on the same floor with me. It is a safe place, never had problems there.

## 2021-08-26 NOTE — BH INPATIENT PSYCHIATRY PROGRESS NOTE - MSE UNSTRUCTURED FT
Male appearing stated age, dressed in hospital gown/scrubs, fair grooming and hygiene. No PMR/PMA. Fair eye contact. Pleasant, cooperative. Speech spontaneous, normal rate and rhythm. Mood "fine" with neutral affect, mildly constricted. TC: linear, goal-directed, coherent. TP: narrative of recent events. No luzmaria delusions elicited. Denies paranoia. Denies S/H IIP. Perception: no suspected perceptual disturbances, denies AVH. Insight and judgement are fair. Impulse control is intact.
Male appearing stated age, dressed in hospital gown/scrubs, fair grooming and hygiene. No PMR/PMA. Fair eye contact. Pleasant, cooperative. Speech spontaneous, normal rate and rhythm. Mood "fine" with neutral affect, mildly constricted. TC: narrative of recent events. TP: linear, goal-directed, coherent. No luzmaria delusions elicited. Denies paranoia. Denies S/H IIP. Perception: no suspected perceptual disturbances, denies AVH. Insight and judgement are fair. Impulse control is intact.

## 2021-08-26 NOTE — BH DISCHARGE NOTE NURSING/SOCIAL WORK/PSYCH REHAB - NSCDUDCCRISIS_PSY_A_CORE
[facility] Well  1 [phone]) [facility]-WELL ([phone])  Text "WELL" to 23403  Website: www.[employer]/.[employer] 1 (504) 531-EVNL (6018) Website: www.safehorizon.org/.National Suicide Prevention Lifeline 8 (033) 914-5910/.  [employer]  1 (977) LIFENET (580-5138)/.  [facility]  [address] [place] [phone]   Hours:  Monday through Friday from 9 AM to 3 PM/.  [employer]  [employer] - Veterans Crisis Line  [phone], Option 1

## 2021-08-26 NOTE — BH INPATIENT PSYCHIATRY PROGRESS NOTE - PRN MEDS
MEDICATIONS  (PRN):  LORazepam     Tablet 2 milliGRAM(s) Oral once PRN Severe Agitation  
MEDICATIONS  (PRN):  LORazepam     Tablet 2 milliGRAM(s) Oral once PRN Severe Agitation

## 2021-08-26 NOTE — BH DISCHARGE NOTE NURSING/SOCIAL WORK/PSYCH REHAB - NSBHDCAGENCY1FT_PSY_A_CORE
[facility] - Dr. Carmela Golden  In [facility]

## 2021-08-26 NOTE — BH DISCHARGE NOTE NURSING/SOCIAL WORK/PSYCH REHAB - NSDCADDINFO1FT_PSY_ALL_CORE
Parking can be found on campus. Your appointment will be in the [facility].   Clinic will be utilizing sliding-scale services so please bring any proof of income.

## 2021-08-26 NOTE — BH SAFETY PLAN - INTERNAL COPING STRATEGY 1
I try to make good sleep, at least 8 hours a day and have my friends watch me, talking with me often.

## 2021-08-26 NOTE — BH INPATIENT PSYCHIATRY PROGRESS NOTE - NSTXDCOTHRGOAL_PSY_ALL_CORE
Pt with no psych history. Will require follow up for potential first onset of symptoms.
Pt with no psych history. Will require follow up for potential first onset of symptoms.

## 2021-08-26 NOTE — BH INPATIENT PSYCHIATRY PROGRESS NOTE - NSBHASSESSSUMMFT_PSY_ALL_CORE
52 y/o Mandarin speaking, domiciled, single, employed, denies PPHx, PMH recent hospitalization of COVID infection (admitted medically at [facility] 7/24-8/4) who was BIB EMS to [facility] for bizzarre behavior after landlord noticed he has been making strange noises at odd hours, poor sleep, increased goal-directed activity, AVH, delusions (witchcraft, cameras watching him). There were concerns for post-COVID psychosis at [facility] and pt underwent neurology & ID consult, w/u (TSH, Folate, B12 WNL; MR brain, EEG also neg). He was treated w/ olanzapine 5 mg, improved, but remained disorganized, transferred on 2 PC for continued psychiatric care.    Pt is organized, denying psychotic symptoms, appears well. Sleeping well, ongoing attenuation of psychotic symptoms. 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 He is able to contract for safety. He appears to be at baseline and is appropriate for discharge.  - C/w Zyprexa 5mg PO qHS  - Discharge once outpt appointment confirmed 52 y/o Mandarin speaking, domiciled, single, employed, denies PPHx, PMH recent hospitalization of COVID infection ([facility]) who was BIB EMS to [facility] for bizzarre behavior after landlord noticed he has been making strange noises at odd hours, poor sleep, increased goal-directed activity, AVH, delusions (witchcraft, cameras watching him). There were concerns for post-COVID psychosis at [facility] and pt underwent neurology & ID consult, w/u (TSH, Folate, B12 WNL; MR brain, EEG also neg). He was treated w/ olanzapine 5 mg, improved, but remained disorganized, transferred on 2 PC for continued psychiatric care.    Pt symptoms improved and he  is more organized, denying psychotic symptoms, appears well. Sleeping well.   Collateral who have known him for sometime and spoke with him throughout medical and current hospitalization report he sounds at baseline.  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 He is able to contract for safety. He appears to be at baseline and is appropriate for discharge.  - C/w Zyprexa 5mg PO qHS  - Discharge with outpt appointment confirmed

## 2021-08-27 ENCOUNTER — OUTPATIENT (OUTPATIENT)
Dept: OUTPATIENT SERVICES | Facility: HOSPITAL | Age: 51
LOS: 1 days | Discharge: OTHER INSTITUTION | End: 2021-08-27

## 2021-08-27 LAB — GLUCOSE BLDC GLUCOMTR-MCNC: 121 MG/DL — HIGH (ref 70–99)

## 2021-10-27 DIAGNOSIS — F23 BRIEF PSYCHOTIC DISORDER: ICD-10-CM

## 2023-03-27 NOTE — BH SOCIAL WORK INITIAL PSYCHOSOCIAL EVALUATION - PATIENT REPRESENTATIVE: ( YOU CAN CHOOSE ANY PERSON THAT CAN ASSIST YOU WITH YOUR HEALTH CARE PREFERENCES, DOES NOT HAVE TO BE A SPOUSE, IMMEDIATE FAMILY OR SIGNIFICANT OTHER/PARTNER)
PCP: Milton Flaherty MD  Medication: amitriptyline  Last Refill: 1/18/23  Last office visit: 1/18/2023  Next visit scheduled: 1/19/24      Refill denied, refills on file at pharmacy  
declines

## 2023-08-04 NOTE — ED ADULT NURSE NOTE - CAS EDN DISCHARGE INTERVENTIONS
Plan: Recommended application of SPF 30 or greater to exposed areas daily. Detail Level: Zone IV intact

## 2023-09-06 NOTE — ED ADULT NURSE NOTE - CAS EDP DISCH DISPOSITION ADMI
[facility]: I discussed with the patients the risks of topial Soolantra. This is a medicine which decreases the number of mites and inflammation in the skin. You experience burning, stinging, eye irritation or allergic reactions.  Please call our office if you develop any problems from using this medication.

## 2025-05-25 NOTE — ED BEHAVIORAL HEALTH ASSESSMENT NOTE - SOURCE OF INFORMATION
- A prescription for diclofenac 75 mg twice daily with food has been provided for a duration of one week, after which it can be taken as needed. A total of 60 pills have been prescribed.  - He is advised to consult with ortho regarding potential injections or physical therapy for his shoulder.   Patient